# Patient Record
Sex: FEMALE | Race: BLACK OR AFRICAN AMERICAN | NOT HISPANIC OR LATINO | Employment: OTHER | URBAN - METROPOLITAN AREA
[De-identification: names, ages, dates, MRNs, and addresses within clinical notes are randomized per-mention and may not be internally consistent; named-entity substitution may affect disease eponyms.]

---

## 2023-06-14 LAB
ANION GAP IN SER/PLAS: 12 MMOL/L (ref 10–20)
APPEARANCE, URINE: CLEAR
BACTERIA, URINE: ABNORMAL /HPF
BILIRUBIN, URINE: NEGATIVE
BLOOD, URINE: NEGATIVE
CALCIDIOL (25 OH VITAMIN D3) (NG/ML) IN SER/PLAS: 36 NG/ML
CALCIUM (MG/DL) IN SER/PLAS: 10.5 MG/DL (ref 8.6–10.3)
CARBON DIOXIDE, TOTAL (MMOL/L) IN SER/PLAS: 26 MMOL/L (ref 21–32)
CHLORIDE (MMOL/L) IN SER/PLAS: 106 MMOL/L (ref 98–107)
COLOR, URINE: YELLOW
CREATININE (MG/DL) IN SER/PLAS: 1.59 MG/DL (ref 0.5–1.05)
CREATININE (MG/DL) IN URINE: 108 MG/DL (ref 20–320)
GFR FEMALE: 33 ML/MIN/1.73M2
GLUCOSE (MG/DL) IN SER/PLAS: 130 MG/DL (ref 74–99)
GLUCOSE, URINE: NEGATIVE MG/DL
HYALINE CASTS, URINE: ABNORMAL /LPF
KETONES, URINE: NEGATIVE MG/DL
LEUKOCYTE ESTERASE, URINE: NEGATIVE
NITRITE, URINE: NEGATIVE
PARATHYRIN INTACT (PG/ML) IN SER/PLAS: 80.1 PG/ML (ref 18.5–88)
PH, URINE: 5 (ref 5–8)
PHOSPHATE (MG/DL) IN SER/PLAS: 2.9 MG/DL (ref 2.5–4.9)
POTASSIUM (MMOL/L) IN SER/PLAS: 5 MMOL/L (ref 3.5–5.3)
PROTEIN (MG/DL) IN URINE: 228 MG/DL (ref 5–24)
PROTEIN, URINE: ABNORMAL MG/DL
PROTEIN/CREATININE (MG/MG) IN URINE: 2.11 MG/MG CREAT (ref 0–0.17)
RBC, URINE: ABNORMAL /HPF (ref 0–5)
SODIUM (MMOL/L) IN SER/PLAS: 139 MMOL/L (ref 136–145)
SPECIFIC GRAVITY, URINE: 1.01 (ref 1–1.03)
URATE (MG/DL) IN SER/PLAS: 7.4 MG/DL (ref 2.3–6.7)
UREA NITROGEN (MG/DL) IN SER/PLAS: 32 MG/DL (ref 6–23)
UROBILINOGEN, URINE: <2 MG/DL (ref 0–1.9)
WBC, URINE: ABNORMAL /HPF (ref 0–5)

## 2023-06-29 ENCOUNTER — APPOINTMENT (OUTPATIENT)
Dept: LAB | Facility: LAB | Age: 78
End: 2023-06-29
Payer: MEDICARE

## 2023-06-30 LAB
IMMUNOGLOBULIN LIGHT CHAINS KAPPA/LAMBDA (MASS RATIO) IN SERUM: 50.73 (ref 0.26–1.65)
IMMUNOGLOBULIN LIGHT CHAINS.KAPPA (MG/DL) IN SERUM: 115.15 MG/DL (ref 0.33–1.94)
IMMUNOGLOBULIN LIGHT CHAINS.LAMBDA (MG/DL) IN SERUM: 2.27 MG/DL (ref 0.57–2.63)

## 2023-07-05 LAB
ALBUMIN ELP: 4.3 G/DL (ref 3.4–5)
ALBUMIN/PROTEIN TOTAL (%) IN URINE BY ELECTROPHORESIS: 80.7 %
ALPHA 1 GLOBULIN/PROTEIN TOTAL (%) IN URINE BY ELECTROPHORESIS: 2.4 %
ALPHA 1: 0.4 G/DL (ref 0.2–0.6)
ALPHA 2 GLOBULIN/PROTEIN TOTAL (%) IN URINE BY ELECTROPHORESIS: 2.5 %
ALPHA 2: 1.1 G/DL (ref 0.4–1.1)
BETA GLOBULIN/PROTEIN TOTAL (%) IN URINE BY ELECTROPHORESIS: 5.8 %
BETA: 0.9 G/DL (ref 0.5–1.2)
GAMMA GLOBULIN/PROTEIN TOTAL (%) IN URINE BY ELECTROPHORESIS: 8.6 %
GAMMA GLOBULIN: 0.9 G/DL (ref 0.5–1.4)
IMMUNOFIXATION INTERPRETATION: ABNORMAL
M-PROTEIN 1 URINE %: 1.6 %
PATH REVIEW - SERUM IMMUNOFIXATION: NORMAL
PATH REVIEW - URINE IMMUNOFIXATION: NORMAL
PATH REVIEW-SERUM PROTEIN ELECTROPHORESIS: NORMAL
PATH REVIEW-URINE PROTEIN ELECTROPHORESIS: NORMAL
PROTEIN (MG/DL) IN URINE: 258 MG/DL (ref 5–24)
PROTEIN ELECTROPHORESIS INTERPRETATION: NORMAL
PROTEIN TOTAL: 7.5 G/DL (ref 6.4–8.2)
SERUM IMMUNOFIXATION INTERPRETATION: NORMAL
UPEP INTERPRETATION: ABNORMAL

## 2023-09-30 PROBLEM — Q23.1 BICUSPID AORTIC VALVE (HHS-HCC): Status: ACTIVE | Noted: 2023-09-30

## 2023-09-30 PROBLEM — I35.2 AORTIC INSUFFICIENCY WITH AORTIC STENOSIS: Status: ACTIVE | Noted: 2023-09-30

## 2023-09-30 PROBLEM — J44.9 CHRONIC OBSTRUCTIVE PULMONARY DISEASE, UNSPECIFIED (MULTI): Status: ACTIVE | Noted: 2022-08-11

## 2023-09-30 PROBLEM — K57.90 DVRTCLOS OF INTEST, PART UNSP, W/O PERF OR ABSCESS W/O BLEED: Status: ACTIVE | Noted: 2022-10-10

## 2023-09-30 PROBLEM — N18.9 CKD (CHRONIC KIDNEY DISEASE): Status: ACTIVE | Noted: 2023-09-30

## 2023-09-30 PROBLEM — R35.1 NOCTURIA: Status: ACTIVE | Noted: 2023-09-30

## 2023-09-30 PROBLEM — H35.3131 EARLY DRY STAGE NONEXUDATIVE AGE-RELATED MACULAR DEGENERATION OF BOTH EYES: Status: ACTIVE | Noted: 2019-07-15

## 2023-09-30 PROBLEM — E87.5 HYPERKALEMIA: Status: ACTIVE | Noted: 2023-09-30

## 2023-09-30 PROBLEM — M19.90 UNSPECIFIED OSTEOARTHRITIS, UNSPECIFIED SITE: Status: ACTIVE | Noted: 2022-10-10

## 2023-09-30 PROBLEM — I35.0 AORTIC STENOSIS, MODERATE: Status: ACTIVE | Noted: 2023-09-30

## 2023-09-30 PROBLEM — N18.30 STAGE 3 CHRONIC KIDNEY DISEASE (MULTI): Status: ACTIVE | Noted: 2022-10-05

## 2023-09-30 PROBLEM — A59.9 TRICHOMONIASIS: Status: ACTIVE | Noted: 2023-09-30

## 2023-09-30 PROBLEM — N89.8 VAGINAL DISCHARGE: Status: RESOLVED | Noted: 2023-09-30 | Resolved: 2023-09-30

## 2023-09-30 PROBLEM — N89.8 VAGINAL DISCHARGE: Status: ACTIVE | Noted: 2023-09-30

## 2023-09-30 PROBLEM — K60.3 PERIANAL FISTULA: Status: ACTIVE | Noted: 2023-09-30

## 2023-09-30 PROBLEM — F17.200 TOBACCO USE DISORDER: Status: ACTIVE | Noted: 2023-09-30

## 2023-09-30 PROBLEM — I25.10 ATHSCL HEART DISEASE OF NATIVE CORONARY ARTERY W/O ANG PCTRS: Status: ACTIVE | Noted: 2022-10-10

## 2023-09-30 PROBLEM — Z95.1 PRESENCE OF AORTOCORONARY BYPASS GRAFT: Status: ACTIVE | Noted: 2022-10-10

## 2023-09-30 PROBLEM — E08.9 DIABETES MELLITUS DUE TO UNDERLYING CONDITION WITHOUT COMPLICATION, WITHOUT LONG-TERM CURRENT USE OF INSULIN (MULTI): Status: ACTIVE | Noted: 2019-07-15

## 2023-09-30 PROBLEM — R91.1 SOLITARY PULMONARY NODULE: Status: ACTIVE | Noted: 2018-11-07

## 2023-09-30 PROBLEM — D62 ACUTE POSTHEMORRHAGIC ANEMIA: Status: ACTIVE | Noted: 2022-10-10

## 2023-09-30 PROBLEM — D47.2 GAMMOPATHY: Status: ACTIVE | Noted: 2023-09-30

## 2023-09-30 PROBLEM — R13.10 DYSPHAGIA, UNSPECIFIED: Status: ACTIVE | Noted: 2022-10-10

## 2023-09-30 PROBLEM — J92.9 PLEURAL THICKENING: Status: ACTIVE | Noted: 2023-09-30

## 2023-09-30 PROBLEM — D12.6 TUBULAR ADENOMA OF COLON: Status: ACTIVE | Noted: 2023-09-30

## 2023-09-30 PROBLEM — D62 ACUTE ON CHRONIC BLOOD LOSS ANEMIA: Status: ACTIVE | Noted: 2022-10-10

## 2023-09-30 PROBLEM — R87.810 ASCUS WITH POSITIVE HIGH RISK HPV CERVICAL: Status: ACTIVE | Noted: 2023-09-30

## 2023-09-30 PROBLEM — R93.89 ABNORMAL FINDING ON CT SCAN: Status: ACTIVE | Noted: 2023-09-30

## 2023-09-30 PROBLEM — E11.42 DIABETIC PERIPHERAL NEUROPATHY (MULTI): Status: ACTIVE | Noted: 2023-09-30

## 2023-09-30 PROBLEM — E11.22 TYPE 2 DIABETES MELLITUS WITH DIABETIC CHRONIC KIDNEY DISEASE (MULTI): Status: ACTIVE | Noted: 2022-10-10

## 2023-09-30 PROBLEM — Q23.81 BICUSPID AORTIC VALVE: Status: ACTIVE | Noted: 2023-09-30

## 2023-09-30 PROBLEM — E55.9 MILD VITAMIN D DEFICIENCY: Status: ACTIVE | Noted: 2023-09-30

## 2023-09-30 PROBLEM — Z95.2 S/P AVR (AORTIC VALVE REPLACEMENT): Status: ACTIVE | Noted: 2023-09-30

## 2023-09-30 PROBLEM — I25.10 ATHEROSCLEROSIS OF NATIVE CORONARY ARTERY OF NATIVE HEART WITHOUT ANGINA PECTORIS: Status: ACTIVE | Noted: 2022-08-11

## 2023-09-30 PROBLEM — H25.13 AGE-RELATED NUCLEAR CATARACT OF BOTH EYES: Status: ACTIVE | Noted: 2019-07-15

## 2023-09-30 PROBLEM — Z95.4 PRESENCE OF OTHER HEART-VALVE REPLACEMENT: Status: ACTIVE | Noted: 2022-10-10

## 2023-09-30 PROBLEM — E78.5 HYPERLIPIDEMIA: Status: ACTIVE | Noted: 2022-10-10

## 2023-09-30 PROBLEM — Z95.1 S/P CABG X 1: Status: ACTIVE | Noted: 2023-09-30

## 2023-09-30 PROBLEM — D12.6 ADENOMATOUS POLYP OF COLON: Status: ACTIVE | Noted: 2023-09-30

## 2023-09-30 PROBLEM — R80.9 PROTEINURIA: Status: ACTIVE | Noted: 2023-09-30

## 2023-09-30 PROBLEM — I35.1 AORTIC CUSP REGURGITATION: Status: ACTIVE | Noted: 2023-09-30

## 2023-09-30 PROBLEM — L84 PRE-ULCERATIVE CORN OR CALLOUS: Status: ACTIVE | Noted: 2023-09-30

## 2023-09-30 PROBLEM — B39.9 HISTOPLASMOSIS: Status: ACTIVE | Noted: 2022-10-10

## 2023-09-30 PROBLEM — R63.4 WEIGHT LOSS: Status: ACTIVE | Noted: 2023-09-30

## 2023-09-30 PROBLEM — Z87.891 PERSONAL HISTORY OF NICOTINE DEPENDENCE: Status: ACTIVE | Noted: 2022-10-10

## 2023-09-30 PROBLEM — H25.013 CORTICAL SENILE CATARACT OF BOTH EYES: Status: ACTIVE | Noted: 2023-09-30

## 2023-09-30 PROBLEM — I12.9 HYPERTENSIVE CHRONIC KIDNEY DISEASE W STG 1-4/UNSP CHR KDNY: Status: ACTIVE | Noted: 2022-10-10

## 2023-09-30 PROBLEM — G25.2 ACTION TREMOR: Status: ACTIVE | Noted: 2023-09-30

## 2023-09-30 PROBLEM — R39.15 URINARY URGENCY: Status: ACTIVE | Noted: 2023-09-30

## 2023-09-30 PROBLEM — G25.2 INTENTION TREMOR: Status: ACTIVE | Noted: 2023-03-30

## 2023-09-30 PROBLEM — Z04.9 CONDITION NOT FOUND: Status: ACTIVE | Noted: 2023-09-30

## 2023-09-30 PROBLEM — R09.89 CAROTID BRUIT: Status: ACTIVE | Noted: 2023-03-30

## 2023-09-30 PROBLEM — R87.610 ASCUS WITH POSITIVE HIGH RISK HPV CERVICAL: Status: ACTIVE | Noted: 2023-09-30

## 2023-09-30 PROBLEM — B39.9 HISTOPLASMOSIS, UNSPECIFIED: Status: ACTIVE | Noted: 2022-10-10

## 2023-09-30 PROBLEM — R01.1 HEART MURMUR: Status: ACTIVE | Noted: 2023-09-30

## 2023-09-30 PROBLEM — M54.50 LOW BACK PAIN: Status: ACTIVE | Noted: 2023-09-30

## 2023-09-30 PROBLEM — K60.30 PERIANAL FISTULA: Status: ACTIVE | Noted: 2023-09-30

## 2023-09-30 RX ORDER — TRAMADOL HYDROCHLORIDE 50 MG/1
50 TABLET ORAL EVERY 6 HOURS
COMMUNITY
Start: 2022-10-05

## 2023-09-30 RX ORDER — POLYETHYLENE GLYCOL 3350 17 G/17G
POWDER, FOR SOLUTION ORAL
COMMUNITY
Start: 2022-10-02

## 2023-09-30 RX ORDER — PREDNISOLONE ACETATE 10 MG/ML
SUSPENSION/ DROPS OPHTHALMIC 3 TIMES DAILY
COMMUNITY
Start: 2023-04-18

## 2023-09-30 RX ORDER — ASPIRIN 81 MG
1 TABLET, DELAYED RELEASE (ENTERIC COATED) ORAL DAILY
COMMUNITY
Start: 2022-10-10

## 2023-09-30 RX ORDER — AMLODIPINE BESYLATE 10 MG/1
10 TABLET ORAL DAILY
COMMUNITY

## 2023-09-30 RX ORDER — LISINOPRIL 40 MG/1
40 TABLET ORAL DAILY
COMMUNITY
Start: 2023-06-14

## 2023-09-30 RX ORDER — GLYBURIDE 2.5 MG/1
1 TABLET ORAL DAILY
COMMUNITY
Start: 2006-06-01

## 2023-09-30 RX ORDER — DOCUSATE SODIUM 100 MG/1
100 CAPSULE, LIQUID FILLED ORAL 2 TIMES DAILY PRN
COMMUNITY
Start: 2022-10-02

## 2023-09-30 RX ORDER — MULTIVITAMIN WITH MINERALS
1 TABLET ORAL DAILY
COMMUNITY

## 2023-09-30 RX ORDER — ATORVASTATIN CALCIUM 40 MG/1
TABLET, FILM COATED ORAL
COMMUNITY
Start: 2006-06-01

## 2023-09-30 RX ORDER — LISINOPRIL 20 MG/1
TABLET ORAL
COMMUNITY
Start: 2013-09-09

## 2023-09-30 RX ORDER — AMLODIPINE BESYLATE 5 MG/1
5 TABLET ORAL DAILY
COMMUNITY
Start: 2022-10-05

## 2023-09-30 RX ORDER — HYDROCHLOROTHIAZIDE 25 MG/1
25 TABLET ORAL
COMMUNITY

## 2023-09-30 RX ORDER — DIFLUPREDNATE OPHTHALMIC 0.5 MG/ML
1 EMULSION OPHTHALMIC 4 TIMES DAILY
COMMUNITY
Start: 2023-06-15

## 2023-09-30 RX ORDER — CHOLECALCIFEROL (VITAMIN D3) 25 MCG
25 TABLET ORAL DAILY
COMMUNITY

## 2023-09-30 RX ORDER — KETOROLAC TROMETHAMINE 5 MG/ML
1 SOLUTION OPHTHALMIC 4 TIMES DAILY
COMMUNITY
Start: 2023-06-15

## 2023-09-30 RX ORDER — METOPROLOL SUCCINATE 50 MG/1
1 TABLET, EXTENDED RELEASE ORAL DAILY
COMMUNITY
Start: 2006-07-25

## 2023-09-30 RX ORDER — LOPERAMIDE HCL 2 MG
2 TABLET ORAL AS NEEDED
COMMUNITY

## 2023-09-30 RX ORDER — ACETAMINOPHEN 325 MG/1
TABLET ORAL
COMMUNITY
Start: 2022-10-02

## 2023-09-30 RX ORDER — ASPIRIN 81 MG/1
TABLET ORAL DAILY
COMMUNITY
Start: 2013-09-09

## 2023-09-30 RX ORDER — FERROUS SULFATE 325(65) MG
TABLET ORAL DAILY
COMMUNITY
Start: 2022-10-10

## 2023-09-30 RX ORDER — METOPROLOL TARTRATE 50 MG/1
50 TABLET ORAL 2 TIMES DAILY
COMMUNITY
Start: 2022-10-05

## 2023-09-30 RX ORDER — CHLORTHALIDONE 25 MG/1
TABLET ORAL DAILY
COMMUNITY
Start: 2023-06-14

## 2023-10-02 ENCOUNTER — HOSPITAL ENCOUNTER (OUTPATIENT)
Dept: CARDIOLOGY | Facility: HOSPITAL | Age: 78
Discharge: HOME | End: 2023-10-02
Payer: MEDICARE

## 2023-10-02 DIAGNOSIS — Z95.2 S/P TAVR (TRANSCATHETER AORTIC VALVE REPLACEMENT): ICD-10-CM

## 2023-10-02 PROCEDURE — 93306 TTE W/DOPPLER COMPLETE: CPT | Performed by: INTERNAL MEDICINE

## 2023-10-02 PROCEDURE — 93306 TTE W/DOPPLER COMPLETE: CPT

## 2023-10-03 ENCOUNTER — TELEPHONE (OUTPATIENT)
Dept: ADMISSION | Facility: HOSPITAL | Age: 78
End: 2023-10-03
Payer: MEDICARE

## 2023-10-03 DIAGNOSIS — D50.8 OTHER IRON DEFICIENCY ANEMIA: Primary | ICD-10-CM

## 2023-10-03 NOTE — TELEPHONE ENCOUNTER
The patient has a PET scan tomorrow and wants to know if the team wants lab work tomorrow because she feels like her H&H might be low. Message routed to the team to see if the labs can be ordered for tomorrow.

## 2023-10-03 NOTE — TELEPHONE ENCOUNTER
CBC w/ D ordered per Pedro Brody. Pt aware that labs ordered, will go to lab and was appreciative.

## 2023-10-04 ENCOUNTER — HOSPITAL ENCOUNTER (OUTPATIENT)
Dept: RADIOLOGY | Facility: HOSPITAL | Age: 78
Discharge: HOME | End: 2023-10-04
Payer: MEDICARE

## 2023-10-04 ENCOUNTER — LAB (OUTPATIENT)
Dept: LAB | Facility: HOSPITAL | Age: 78
End: 2023-10-04
Payer: MEDICARE

## 2023-10-04 ENCOUNTER — APPOINTMENT (OUTPATIENT)
Dept: RADIOLOGY | Facility: HOSPITAL | Age: 78
End: 2023-10-04
Payer: MEDICARE

## 2023-10-04 DIAGNOSIS — D50.8 OTHER IRON DEFICIENCY ANEMIA: ICD-10-CM

## 2023-10-04 DIAGNOSIS — C90.00 MULTIPLE MYELOMA NOT HAVING ACHIEVED REMISSION (MULTI): ICD-10-CM

## 2023-10-04 DIAGNOSIS — R63.4 WEIGHT LOSS: Primary | ICD-10-CM

## 2023-10-04 LAB
BASOPHILS # BLD AUTO: 0.09 X10*3/UL (ref 0–0.1)
BASOPHILS NFR BLD AUTO: 1.1 %
EOSINOPHIL # BLD AUTO: 0.29 X10*3/UL (ref 0–0.4)
EOSINOPHIL NFR BLD AUTO: 3.7 %
ERYTHROCYTE [DISTWIDTH] IN BLOOD BY AUTOMATED COUNT: 13.4 % (ref 11.5–14.5)
GLUCOSE BLD MANUAL STRIP-MCNC: 157 MG/DL (ref 74–99)
HCT VFR BLD AUTO: 32.2 % (ref 36–46)
HGB BLD-MCNC: 10.6 G/DL (ref 12–16)
IMM GRANULOCYTES # BLD AUTO: 0.03 X10*3/UL (ref 0–0.5)
IMM GRANULOCYTES NFR BLD AUTO: 0.4 % (ref 0–0.9)
LYMPHOCYTES # BLD AUTO: 2.31 X10*3/UL (ref 0.8–3)
LYMPHOCYTES NFR BLD AUTO: 29.1 %
MCH RBC QN AUTO: 30.2 PG (ref 26–34)
MCHC RBC AUTO-ENTMCNC: 32.9 G/DL (ref 32–36)
MCV RBC AUTO: 92 FL (ref 80–100)
MONOCYTES # BLD AUTO: 0.58 X10*3/UL (ref 0.05–0.8)
MONOCYTES NFR BLD AUTO: 7.3 %
NEUTROPHILS # BLD AUTO: 4.64 X10*3/UL (ref 1.6–5.5)
NEUTROPHILS NFR BLD AUTO: 58.4 %
NRBC BLD-RTO: 0 /100 WBCS (ref 0–0)
PLATELET # BLD AUTO: 185 X10*3/UL (ref 150–450)
PMV BLD AUTO: 10.9 FL (ref 7.5–11.5)
RBC # BLD AUTO: 3.51 X10*6/UL (ref 4–5.2)
WBC # BLD AUTO: 7.9 X10*3/UL (ref 4.4–11.3)

## 2023-10-04 PROCEDURE — A9552 F18 FDG: HCPCS | Mod: SE

## 2023-10-04 PROCEDURE — 82947 ASSAY GLUCOSE BLOOD QUANT: CPT

## 2023-10-04 PROCEDURE — 78816 PET IMAGE W/CT FULL BODY: CPT | Mod: PET TUMOR SUBSQ TX STRATEGY | Performed by: NUCLEAR MEDICINE

## 2023-10-04 PROCEDURE — 85025 COMPLETE CBC W/AUTO DIFF WBC: CPT

## 2023-10-04 PROCEDURE — 3430000001 HC RX 343 DIAGNOSTIC RADIOPHARMACEUTICALS: Mod: SE

## 2023-10-04 PROCEDURE — 78816 PET IMAGE W/CT FULL BODY: CPT | Mod: PS

## 2023-10-04 PROCEDURE — 36415 COLL VENOUS BLD VENIPUNCTURE: CPT

## 2023-10-04 RX ORDER — FLUDEOXYGLUCOSE F 18 200 MCI/ML
9.3 INJECTION, SOLUTION INTRAVENOUS
Status: COMPLETED | OUTPATIENT
Start: 2023-10-04 | End: 2023-10-04

## 2023-10-04 RX ADMIN — FLUDEOXYGLUCOSE F 18 9.3 MILLICURIE: 200 INJECTION, SOLUTION INTRAVENOUS at 10:38

## 2023-10-05 ENCOUNTER — TELEPHONE (OUTPATIENT)
Dept: CARDIOLOGY | Facility: HOSPITAL | Age: 78
End: 2023-10-05
Payer: MEDICARE

## 2023-10-05 NOTE — TELEPHONE ENCOUNTER
Called and LVM for patient with results.     Janette Ring MD  [facility] [place] [id number] Clinical Support Staff    Kindly inform the patient that the echocardiogram is normal and her prosthetic valve is functioning well.  Just as planned, I will see her again in 6 months.

## 2023-10-05 NOTE — RESULT ENCOUNTER NOTE
Kindly inform the patient that the echocardiogram is normal and her prosthetic valve is functioning well.  Just as planned, I will see her again in 6 months.

## 2023-10-11 ENCOUNTER — TELEPHONE (OUTPATIENT)
Dept: ADMISSION | Facility: HOSPITAL | Age: 78
End: 2023-10-11
Payer: MEDICARE

## 2023-10-11 DIAGNOSIS — D50.8 OTHER IRON DEFICIENCY ANEMIA: ICD-10-CM

## 2023-10-11 NOTE — TELEPHONE ENCOUNTER
Per provider clinic note patient was supposed to have a phone visit to review results. Provider has a visit tomorrow 10/12/2023 at 0930, patient agreed to visit. Orders placed.

## 2023-10-12 ENCOUNTER — TELEPHONE (OUTPATIENT)
Dept: ADMISSION | Facility: HOSPITAL | Age: 78
End: 2023-10-12
Payer: MEDICARE

## 2023-10-12 ENCOUNTER — TELEMEDICINE (OUTPATIENT)
Dept: HEMATOLOGY/ONCOLOGY | Facility: HOSPITAL | Age: 78
End: 2023-10-12
Payer: MEDICARE

## 2023-10-12 DIAGNOSIS — S32.010S COMPRESSION FRACTURE OF L1 LUMBAR VERTEBRA, SEQUELA: ICD-10-CM

## 2023-10-12 DIAGNOSIS — D47.2 SMOLDERING MULTIPLE MYELOMA (SMM): Primary | ICD-10-CM

## 2023-10-12 DIAGNOSIS — D47.2 SMOLDERING MULTIPLE MYELOMA: Primary | ICD-10-CM

## 2023-10-12 PROCEDURE — 99443 PR PHYS/QHP TELEPHONE EVALUATION 21-30 MIN: CPT

## 2023-10-12 ASSESSMENT — ENCOUNTER SYMPTOMS
HEMATOLOGIC/LYMPHATIC NEGATIVE: 1
CARDIOVASCULAR NEGATIVE: 1
RESPIRATORY NEGATIVE: 1
NEUROLOGICAL NEGATIVE: 1
CONSTITUTIONAL NEGATIVE: 1
ENDOCRINE NEGATIVE: 1
EYES NEGATIVE: 1
GASTROINTESTINAL NEGATIVE: 1
PSYCHIATRIC NEGATIVE: 1
MUSCULOSKELETAL NEGATIVE: 1

## 2023-10-12 NOTE — PROGRESS NOTES
Patient ID: Felipa Hood is a 78 y.o. female.    Diagnosis: Kappa Light Chain Smoldering Myeloma    Has a past medical history of aortic stenosis, HTN, CKD 3, DM2, and HLD. Presents as a referral from her nephrologist Dr. Garcia for concern for plasma cell dyscrasia.      Workup:  SPEP (6/29/23): Discrete Kappa FLC not definitively monoclonal  SFLC (6/29/23): Kappa .15mg/dL, FLC ratio 50.73  Spot UPEP (6/29/23): Total protein 258, 1.6% kappa FLC   Osseous survey (8/2/23): negative for lytic lesions however showed osseous demineralization   24 hour urine (8/4/23): Total protein 538, UPEP pending   BMBx (9/7/23):  -- NORMOCELLULAR BONE MARROW (20-30%) WITH MATURING TRILINEAGE HEMATOPOIESIS  INVOLVEMENT BY KAPPA+ PLASMA CELL NEOPLASM (15-20% BY IMMUNOSTAINS), SEE NOTE.     NOTE: The findings are consistent with plasma cell neoplasm. In absence of myeloma defining events, the findings could be consistent with smoldering myeloma. Clinical, radiological and laboratory correlation is suggested.    FISH:   This analysis showed an abnormal result with gain of chromosome 1q and loss of IGH.  The assay did not find loss of 1p or rearrangement of IGH.  Please note, due to the quality of cells there was poor hybridization of probes for TP53 and CEP 3, 7, and 11. Therefore analysis for loss of TP53 and hyperdiploidy of chromosomes 3, 7, and 11 could not be performed.    PET/CT (10/4/23):   1. No evidence of a hypermetabolic osteolytic lesion throughout the  axial and appendicular skeleton to suggest a focal myelomatous  process.  2. Diffuse FDG uptake along the superior endplate of L1 vertebral  body compatible with recent compression fracture.         Medical History:  Aortic stenosis  HTN  CKD stage 3  DM 2  Cataracts  HLD  Pulmonary nodules  Histoplasmosis  Lower back pain     Surgical History:  Aortic valve replacement  CABG  Lung wedge resection      Social History:  Retired  Former smoker     Family History:    Mother- malignant neoplasm  Father- HTN, lung disease, malignant neoplasm        -------------------------------------------------------------------------------------------------------  Subjective       HPI  Felipa presents to clinic 10/12/23 for a follow up phone visit to review her PET/CT.    She is feeling well overall. Doesn't even notice her back pain most of the time related to the compression fracture. Applying pain patches.    Planning to do some traveling in the near future.     Review of Systems   Constitutional: Negative.    HENT:  Negative.     Eyes: Negative.    Respiratory: Negative.     Cardiovascular: Negative.    Gastrointestinal: Negative.    Endocrine: Negative.    Genitourinary: Negative.     Musculoskeletal: Negative.    Skin: Negative.    Neurological: Negative.    Hematological: Negative.    Psychiatric/Behavioral: Negative.        -------------------------------------------------------------------------------------------------------  Objective   BSA: There is no height or weight on file to calculate BSA.  There were no vitals taken for this visit.    Performance Status:  Asymptomatic  -------------------------------------------------------------------------------------------------------  Assessment/Plan      Plasma Cell Dyscrasia:  - Discrete Kappa FLC not definitively monoclonal noted on SPEP  - Spot UPEP showed 1.6% Kappa FLC M protein  - Kappa .15mg/dL, FLC ratio 50.73  - 24 hour UPEP pending  - Osseous survey negative for lytic lesions   - BMBx showed 15-20% plasma cells consistent with smoldering myeloma  - FISH showed gain of 1q and rearrangement of IGH  - PET/CT negative outside of L1 compression fracture  - Labs in 1 month     Renal:  - Follows with Dr. Garcia  - CKD stage 4  - Recent sCr >3, hence workup for plasma cell dyscrasia  - sCr 1.83mg/dL (9/9/23)     Back Fracture:  - Obtained back fracture when moving TV  - No surgical intervention necessary  - Pain improving   -  Using PRN Lidocaine patches     Cardiology:  - Follows with Dr. 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

## 2023-10-12 NOTE — TELEPHONE ENCOUNTER
Felipa called the office upset because she was supposed to have a phone appointment this morning to review her PET results but scheduling never completed the order. I secure chatted Pedro Brody who confirmed his schedule is full, however, he will call Felipa today with her results.  She voiced frustration with the  system. I offered her the number for patient advocacy which she did not want. No further questions or concerns at this time.

## 2023-10-27 ENCOUNTER — TELEPHONE (OUTPATIENT)
Dept: HEMATOLOGY/ONCOLOGY | Facility: HOSPITAL | Age: 78
End: 2023-10-27
Payer: MEDICARE

## 2023-10-27 NOTE — TELEPHONE ENCOUNTER
Called and left Felipa a message. Per Pedro Brody CNP, another PET scan is not needed. Plan is for Felipa to get labs the last week of November and follow up with a phone visit the first week of December.

## 2023-11-08 ENCOUNTER — APPOINTMENT (OUTPATIENT)
Dept: RADIOLOGY | Facility: HOSPITAL | Age: 78
End: 2023-11-08
Payer: MEDICARE

## 2023-11-08 ENCOUNTER — APPOINTMENT (OUTPATIENT)
Dept: LAB | Facility: HOSPITAL | Age: 78
End: 2023-11-08
Payer: MEDICARE

## 2023-11-30 ENCOUNTER — LAB (OUTPATIENT)
Dept: LAB | Facility: HOSPITAL | Age: 78
End: 2023-11-30
Payer: MEDICARE

## 2023-11-30 DIAGNOSIS — D47.2 SMOLDERING MULTIPLE MYELOMA: ICD-10-CM

## 2023-11-30 LAB
ALBUMIN SERPL BCP-MCNC: 4.4 G/DL (ref 3.4–5)
ALP SERPL-CCNC: 77 U/L (ref 33–136)
ALT SERPL W P-5'-P-CCNC: 9 U/L (ref 7–45)
ANION GAP SERPL CALC-SCNC: 15 MMOL/L (ref 10–20)
AST SERPL W P-5'-P-CCNC: 16 U/L (ref 9–39)
BASOPHILS # BLD AUTO: 0.09 X10*3/UL (ref 0–0.1)
BASOPHILS NFR BLD AUTO: 1 %
BILIRUB SERPL-MCNC: 0.4 MG/DL (ref 0–1.2)
BUN SERPL-MCNC: 30 MG/DL (ref 6–23)
CALCIUM SERPL-MCNC: 10.4 MG/DL (ref 8.6–10.3)
CHLORIDE SERPL-SCNC: 109 MMOL/L (ref 98–107)
CO2 SERPL-SCNC: 20 MMOL/L (ref 21–32)
CREAT SERPL-MCNC: 1.53 MG/DL (ref 0.5–1.05)
EOSINOPHIL # BLD AUTO: 0.48 X10*3/UL (ref 0–0.4)
EOSINOPHIL NFR BLD AUTO: 5.2 %
ERYTHROCYTE [DISTWIDTH] IN BLOOD BY AUTOMATED COUNT: 13.9 % (ref 11.5–14.5)
GFR SERPL CREATININE-BSD FRML MDRD: 34 ML/MIN/1.73M*2
GLUCOSE SERPL-MCNC: 123 MG/DL (ref 74–99)
HCT VFR BLD AUTO: 31.1 % (ref 36–46)
HGB BLD-MCNC: 10.1 G/DL (ref 12–16)
IGA SERPL-MCNC: 245 MG/DL (ref 70–400)
IGG SERPL-MCNC: 1020 MG/DL (ref 700–1600)
IGM SERPL-MCNC: 23 MG/DL (ref 40–230)
IMM GRANULOCYTES # BLD AUTO: 0.05 X10*3/UL (ref 0–0.5)
IMM GRANULOCYTES NFR BLD AUTO: 0.5 % (ref 0–0.9)
LYMPHOCYTES # BLD AUTO: 2.48 X10*3/UL (ref 0.8–3)
LYMPHOCYTES NFR BLD AUTO: 26.7 %
MCH RBC QN AUTO: 29.7 PG (ref 26–34)
MCHC RBC AUTO-ENTMCNC: 32.5 G/DL (ref 32–36)
MCV RBC AUTO: 92 FL (ref 80–100)
MONOCYTES # BLD AUTO: 0.71 X10*3/UL (ref 0.05–0.8)
MONOCYTES NFR BLD AUTO: 7.6 %
NEUTROPHILS # BLD AUTO: 5.49 X10*3/UL (ref 1.6–5.5)
NEUTROPHILS NFR BLD AUTO: 59 %
NRBC BLD-RTO: 0 /100 WBCS (ref 0–0)
PLATELET # BLD AUTO: 243 X10*3/UL (ref 150–450)
POTASSIUM SERPL-SCNC: 4.7 MMOL/L (ref 3.5–5.3)
PROT SERPL-MCNC: 7.1 G/DL (ref 6.4–8.2)
PROT SERPL-MCNC: 7.4 G/DL (ref 6.4–8.2)
RBC # BLD AUTO: 3.4 X10*6/UL (ref 4–5.2)
SODIUM SERPL-SCNC: 139 MMOL/L (ref 136–145)
WBC # BLD AUTO: 9.3 X10*3/UL (ref 4.4–11.3)

## 2023-11-30 PROCEDURE — 84165 PROTEIN E-PHORESIS SERUM: CPT

## 2023-11-30 PROCEDURE — 36415 COLL VENOUS BLD VENIPUNCTURE: CPT

## 2023-11-30 PROCEDURE — 84165 PROTEIN E-PHORESIS SERUM: CPT | Performed by: STUDENT IN AN ORGANIZED HEALTH CARE EDUCATION/TRAINING PROGRAM

## 2023-11-30 PROCEDURE — 84155 ASSAY OF PROTEIN SERUM: CPT

## 2023-11-30 PROCEDURE — 82784 ASSAY IGA/IGD/IGG/IGM EACH: CPT

## 2023-11-30 PROCEDURE — 83521 IG LIGHT CHAINS FREE EACH: CPT

## 2023-11-30 PROCEDURE — 80053 COMPREHEN METABOLIC PANEL: CPT

## 2023-11-30 PROCEDURE — 86320 SERUM IMMUNOELECTROPHORESIS: CPT | Performed by: STUDENT IN AN ORGANIZED HEALTH CARE EDUCATION/TRAINING PROGRAM

## 2023-11-30 PROCEDURE — 85025 COMPLETE CBC W/AUTO DIFF WBC: CPT

## 2023-12-01 LAB
KAPPA LC SERPL-MCNC: 122.77 MG/DL (ref 0.33–1.94)
KAPPA LC/LAMBDA SER: 59.02 {RATIO} (ref 0.26–1.65)
LAMBDA LC SERPL-MCNC: 2.08 MG/DL (ref 0.57–2.63)

## 2023-12-05 LAB
ALBUMIN: 4 G/DL (ref 3.4–5)
ALPHA 1 GLOBULIN: 0.4 G/DL (ref 0.2–0.6)
ALPHA 2 GLOBULIN: 1 G/DL (ref 0.4–1.1)
BETA GLOBULIN: 0.8 G/DL (ref 0.5–1.2)
GAMMA GLOBULIN: 0.8 G/DL (ref 0.5–1.4)
IMMUNOFIXATION COMMENT: ABNORMAL
M-PROTEIN 1: 0.1 G/DL
PATH REVIEW - SERUM IMMUNOFIXATION: ABNORMAL
PATH REVIEW-SERUM PROTEIN ELECTROPHORESIS: ABNORMAL
PROTEIN ELECTROPHORESIS COMMENT: ABNORMAL

## 2023-12-07 ENCOUNTER — OFFICE VISIT (OUTPATIENT)
Dept: HEMATOLOGY/ONCOLOGY | Facility: HOSPITAL | Age: 78
End: 2023-12-07
Payer: MEDICARE

## 2023-12-07 DIAGNOSIS — D47.2 SMOLDERING MULTIPLE MYELOMA: Primary | ICD-10-CM

## 2023-12-07 PROCEDURE — 99443 PR PHYS/QHP TELEPHONE EVALUATION 21-30 MIN: CPT

## 2023-12-07 PROCEDURE — 1126F AMNT PAIN NOTED NONE PRSNT: CPT

## 2023-12-08 ASSESSMENT — ENCOUNTER SYMPTOMS
RESPIRATORY NEGATIVE: 1
PSYCHIATRIC NEGATIVE: 1
EYES NEGATIVE: 1
ENDOCRINE NEGATIVE: 1
CARDIOVASCULAR NEGATIVE: 1
HEMATOLOGIC/LYMPHATIC NEGATIVE: 1
ALLERGIC/IMMUNOLOGIC NEGATIVE: 1
GASTROINTESTINAL NEGATIVE: 1
NEUROLOGICAL NEGATIVE: 1
BACK PAIN: 1
CONSTITUTIONAL NEGATIVE: 1

## 2023-12-08 NOTE — PROGRESS NOTES
Patient ID: Felipa Hood is a 78 y.o. female.  Referring Physician: No referring provider defined for this encounter.  Primary Care Provider: DB Lobo  Date of Service:  12/7/2023    Diagnosis: Kappa Light Chain Smoldering Myeloma    Has a past medical history of aortic stenosis, HTN, CKD 3, DM2, and HLD. Presents as a referral from her nephrologist Dr. Garcia for concern for plasma cell dyscrasia.      Workup:  SPEP (6/29/23): Discrete Kappa FLC not definitively monoclonal  SFLC (6/29/23): Kappa .15mg/dL, FLC ratio 50.73  Spot UPEP (6/29/23): Total protein 258, 1.6% kappa FLC   Osseous survey (8/2/23): negative for lytic lesions however showed osseous demineralization   24 hour urine (8/4/23): Total protein 538, UPEP pending   BMBx (9/7/23):  -- NORMOCELLULAR BONE MARROW (20-30%) WITH MATURING TRILINEAGE HEMATOPOIESIS  INVOLVEMENT BY KAPPA+ PLASMA CELL NEOPLASM (15-20% BY IMMUNOSTAINS), SEE NOTE.     NOTE: The findings are consistent with plasma cell neoplasm. In absence of myeloma defining events, the findings could be consistent with smoldering myeloma. Clinical, radiological and laboratory correlation is suggested.    FISH:   This analysis showed an abnormal result with gain of chromosome 1q and loss of IGH.  The assay did not find loss of 1p or rearrangement of IGH.  Please note, due to the quality of cells there was poor hybridization of probes for TP53 and CEP 3, 7, and 11. Therefore analysis for loss of TP53 and hyperdiploidy of chromosomes 3, 7, and 11 could not be performed.    PET/CT (10/4/23):   1. No evidence of a hypermetabolic osteolytic lesion throughout the  axial and appendicular skeleton to suggest a focal myelomatous  process.  2. Diffuse FDG uptake along the superior endplate of L1 vertebral  body compatible with recent compression fracture.         Medical History:  Aortic stenosis  HTN  CKD stage 3  DM 2  Cataracts  HLD  Pulmonary nodules  Histoplasmosis  Lower  back pain     Surgical History:  Aortic valve replacement  CABG  Lung wedge resection      Social History:  Retired  Former smoker     Family History:   Mother- malignant neoplasm  Father- HTN, lung disease, malignant neoplasm        ASSESSMENT and PLAN:    Plasma Cell Dyscrasia:  - Discrete Kappa FLC not definitively monoclonal noted on SPEP  - Spot UPEP showed 1.6% Kappa FLC M protein  - Kappa .15mg/dL, FLC ratio 50.73  - 24 hour UPEP pending  - Osseous survey negative for lytic lesions   - BMBx showed 15-20% plasma cells consistent with smoldering myeloma  - FISH showed gain of 1q and rearrangement of IGH  - PET/CT negative outside of L1 compression fracture     Renal:  - Follows with Dr. Garcia  - CKD stage 4  - Recent sCr >3, hence workup for plasma cell dyscrasia  - sCr 1.53mg/dL     Back Fracture:  - Obtained back fracture when moving TV  - No surgical intervention necessary  - Pain improving   - Using PRN Lidocaine patches  - Pain has resolved     Cardiology:  - Follows with Dr. Ring  - History of HTN, Aortic stenosis  - S/p AVR, CABG  - Recent episode of lightheadedness  - Wore Holter monitor     SUBJECTIVE:  History of Present Illness:  Felipa presents to clinic 12/7/23 for a follow up phone call.    Overall she is doing well and is without acute complaints.     Her back is healed for the most part. Has occasional pain but nothing substantial.             Review of Systems   Constitutional: Negative.    HENT: Negative.     Eyes: Negative.    Respiratory: Negative.     Cardiovascular: Negative.    Gastrointestinal: Negative.    Endocrine: Negative.    Genitourinary: Negative.    Musculoskeletal:  Positive for back pain.   Allergic/Immunologic: Negative.    Neurological: Negative.    Hematological: Negative.    Psychiatric/Behavioral: Negative.       OBJECTIVE:  KPS: Karnofsky Score: 90 - Able to carry on normal activity; minor signs or symptoms of disease    VS:  There were no vitals taken for this  visit.  BSA: There is no height or weight on file to calculate BSA.    Laboratory:  The pertinent laboratory results were reviewed and discussed with the patient.    Lab Results   Component Value Date    WBC 9.3 11/30/2023    HCT 31.1 (L) 11/30/2023    HGB 10.1 (L) 11/30/2023     11/30/2023    K 4.7 11/30/2023    CALCIUM 10.4 (H) 11/30/2023     11/30/2023    MG 1.80 11/11/2022    ALT 9 11/30/2023    AST 16 11/30/2023    BUN 30 (H) 11/30/2023    CREATININE 1.53 (H) 11/30/2023    PHOS 2.9 06/14/2023    KAPPA 122.77 (H) 11/30/2023    LAMBDA 2.08 11/30/2023    KAPLS 59.02 (H) 11/30/2023    SPEP Aberrant band detected. See immunofixation. 11/30/2023    IEPIN  11/30/2023 11/30/23  Known monoclonal IgG kappa in the gamma region at 0.1 g/dL. Unchanged from the previous analysis on 8/2/23.    IGG 1,020 11/30/2023    IGM 23 (L) 11/30/2023     11/30/2023      Note: for a comprehensive list of the patient's lab results, access the Results Review activity.    RTC:  2 month phone visit     Arjun Brody, JARED-CNP

## 2023-12-13 LAB — HOLD SPECIMEN: NORMAL

## 2024-02-01 ENCOUNTER — LAB (OUTPATIENT)
Dept: LAB | Facility: HOSPITAL | Age: 79
End: 2024-02-01
Payer: MEDICARE

## 2024-02-01 DIAGNOSIS — D47.2 SMOLDERING MULTIPLE MYELOMA: ICD-10-CM

## 2024-02-01 LAB
ALBUMIN SERPL BCP-MCNC: 4.3 G/DL (ref 3.4–5)
ALP SERPL-CCNC: 79 U/L (ref 33–136)
ALT SERPL W P-5'-P-CCNC: 9 U/L (ref 7–45)
ANION GAP SERPL CALC-SCNC: 15 MMOL/L (ref 10–20)
AST SERPL W P-5'-P-CCNC: 17 U/L (ref 9–39)
BASOPHILS # BLD AUTO: 0.07 X10*3/UL (ref 0–0.1)
BASOPHILS NFR BLD AUTO: 0.9 %
BILIRUB SERPL-MCNC: 0.5 MG/DL (ref 0–1.2)
BUN SERPL-MCNC: 30 MG/DL (ref 6–23)
CALCIUM SERPL-MCNC: 9.9 MG/DL (ref 8.6–10.3)
CHLORIDE SERPL-SCNC: 107 MMOL/L (ref 98–107)
CO2 SERPL-SCNC: 21 MMOL/L (ref 21–32)
CREAT SERPL-MCNC: 1.56 MG/DL (ref 0.5–1.05)
EGFRCR SERPLBLD CKD-EPI 2021: 34 ML/MIN/1.73M*2
EOSINOPHIL # BLD AUTO: 0.34 X10*3/UL (ref 0–0.4)
EOSINOPHIL NFR BLD AUTO: 4.4 %
ERYTHROCYTE [DISTWIDTH] IN BLOOD BY AUTOMATED COUNT: 13.4 % (ref 11.5–14.5)
GLUCOSE SERPL-MCNC: 110 MG/DL (ref 74–99)
HCT VFR BLD AUTO: 31.7 % (ref 36–46)
HGB BLD-MCNC: 10.4 G/DL (ref 12–16)
IGA SERPL-MCNC: 228 MG/DL (ref 70–400)
IGG SERPL-MCNC: 997 MG/DL (ref 700–1600)
IGM SERPL-MCNC: 23 MG/DL (ref 40–230)
IMM GRANULOCYTES # BLD AUTO: 0.02 X10*3/UL (ref 0–0.5)
IMM GRANULOCYTES NFR BLD AUTO: 0.3 % (ref 0–0.9)
LYMPHOCYTES # BLD AUTO: 2.2 X10*3/UL (ref 0.8–3)
LYMPHOCYTES NFR BLD AUTO: 28.2 %
MCH RBC QN AUTO: 30.4 PG (ref 26–34)
MCHC RBC AUTO-ENTMCNC: 32.8 G/DL (ref 32–36)
MCV RBC AUTO: 93 FL (ref 80–100)
MONOCYTES # BLD AUTO: 0.56 X10*3/UL (ref 0.05–0.8)
MONOCYTES NFR BLD AUTO: 7.2 %
NEUTROPHILS # BLD AUTO: 4.61 X10*3/UL (ref 1.6–5.5)
NEUTROPHILS NFR BLD AUTO: 59 %
NRBC BLD-RTO: 0 /100 WBCS (ref 0–0)
PLATELET # BLD AUTO: 202 X10*3/UL (ref 150–450)
POTASSIUM SERPL-SCNC: 4.3 MMOL/L (ref 3.5–5.3)
PROT SERPL-MCNC: 7 G/DL (ref 6.4–8.2)
PROT SERPL-MCNC: 7.3 G/DL (ref 6.4–8.2)
RBC # BLD AUTO: 3.42 X10*6/UL (ref 4–5.2)
SODIUM SERPL-SCNC: 139 MMOL/L (ref 136–145)
WBC # BLD AUTO: 7.8 X10*3/UL (ref 4.4–11.3)

## 2024-02-01 PROCEDURE — 84165 PROTEIN E-PHORESIS SERUM: CPT | Performed by: PATHOLOGY

## 2024-02-01 PROCEDURE — 84155 ASSAY OF PROTEIN SERUM: CPT | Mod: 59

## 2024-02-01 PROCEDURE — 86334 IMMUNOFIX E-PHORESIS SERUM: CPT

## 2024-02-01 PROCEDURE — 82784 ASSAY IGA/IGD/IGG/IGM EACH: CPT

## 2024-02-01 PROCEDURE — 83521 IG LIGHT CHAINS FREE EACH: CPT

## 2024-02-01 PROCEDURE — 80053 COMPREHEN METABOLIC PANEL: CPT

## 2024-02-01 PROCEDURE — 36415 COLL VENOUS BLD VENIPUNCTURE: CPT

## 2024-02-01 PROCEDURE — 85025 COMPLETE CBC W/AUTO DIFF WBC: CPT

## 2024-02-01 PROCEDURE — 86320 SERUM IMMUNOELECTROPHORESIS: CPT | Performed by: PATHOLOGY

## 2024-02-02 LAB
KAPPA LC SERPL-MCNC: 124.09 MG/DL (ref 0.33–1.94)
KAPPA LC/LAMBDA SER: 59.95 {RATIO} (ref 0.26–1.65)
LAMBDA LC SERPL-MCNC: 2.07 MG/DL (ref 0.57–2.63)

## 2024-02-05 LAB
ALBUMIN: 4.1 G/DL (ref 3.4–5)
ALPHA 1 GLOBULIN: 0.3 G/DL (ref 0.2–0.6)
ALPHA 2 GLOBULIN: 0.9 G/DL (ref 0.4–1.1)
BETA GLOBULIN: 0.8 G/DL (ref 0.5–1.2)
GAMMA GLOBULIN: 0.9 G/DL (ref 0.5–1.4)
IMMUNOFIXATION COMMENT: ABNORMAL
M-PROTEIN 1: 0.1 G/DL
PATH REVIEW - SERUM IMMUNOFIXATION: ABNORMAL
PATH REVIEW-SERUM PROTEIN ELECTROPHORESIS: ABNORMAL
PROTEIN ELECTROPHORESIS COMMENT: ABNORMAL

## 2024-02-13 ENCOUNTER — TELEMEDICINE (OUTPATIENT)
Dept: HEMATOLOGY/ONCOLOGY | Facility: HOSPITAL | Age: 79
End: 2024-02-13
Payer: MEDICARE

## 2024-02-13 DIAGNOSIS — D64.9 ANEMIA, UNSPECIFIED TYPE: Primary | ICD-10-CM

## 2024-02-13 DIAGNOSIS — D47.2 SMOLDERING MULTIPLE MYELOMA: ICD-10-CM

## 2024-02-13 PROCEDURE — 99215 OFFICE O/P EST HI 40 MIN: CPT

## 2024-02-13 PROCEDURE — 1126F AMNT PAIN NOTED NONE PRSNT: CPT

## 2024-02-13 ASSESSMENT — ENCOUNTER SYMPTOMS
HEMATOLOGIC/LYMPHATIC NEGATIVE: 1
FATIGUE: 1
CARDIOVASCULAR NEGATIVE: 1
NEUROLOGICAL NEGATIVE: 1
EYES NEGATIVE: 1
ALLERGIC/IMMUNOLOGIC NEGATIVE: 1
RESPIRATORY NEGATIVE: 1
PSYCHIATRIC NEGATIVE: 1
ENDOCRINE NEGATIVE: 1
GASTROINTESTINAL NEGATIVE: 1
BACK PAIN: 1

## 2024-02-13 NOTE — PROGRESS NOTES
Patient ID: Felipa Hood is a 78 y.o. female.  Referring Physician: Arjun Brody, APRN-CNP  [id number] [place] [address]  Primary Care Provider: DB Lobo  Date of Service:  2/13/2024    Diagnosis: Kappa Light Chain Smoldering Myeloma     Has a past medical history of aortic stenosis, HTN, CKD 3, DM2, and HLD. Presents as a referral from her nephrologist Dr. Garcia for concern for plasma cell dyscrasia.      Workup:  SPEP (6/29/23): Discrete Kappa FLC not definitively monoclonal  SFLC (6/29/23): Kappa .15mg/dL, FLC ratio 50.73  Spot UPEP (6/29/23): Total protein 258, 1.6% kappa FLC   Osseous survey (8/2/23): negative for lytic lesions however showed osseous demineralization   24 hour urine (8/4/23): Total protein 538, UPEP pending   BMBx (9/7/23):  -- NORMOCELLULAR BONE MARROW (20-30%) WITH MATURING TRILINEAGE HEMATOPOIESIS  INVOLVEMENT BY KAPPA+ PLASMA CELL NEOPLASM (15-20% BY IMMUNOSTAINS), SEE NOTE.     NOTE: The findings are consistent with plasma cell neoplasm. In absence of myeloma defining events, the findings could be consistent with smoldering myeloma. Clinical, radiological and laboratory correlation is suggested.     FISH:   This analysis showed an abnormal result with gain of chromosome 1q and loss of IGH.  The assay did not find loss of 1p or rearrangement of IGH.  Please note, due to the quality of cells there was poor hybridization of probes for TP53 and CEP 3, 7, and 11. Therefore analysis for loss of TP53 and hyperdiploidy of chromosomes 3, 7, and 11 could not be performed.     PET/CT (10/4/23):   1. No evidence of a hypermetabolic osteolytic lesion throughout the  axial and appendicular skeleton to suggest a focal myelomatous  process.  2. Diffuse FDG uptake along the superior endplate of L1 vertebral  body compatible with recent compression fracture.         Medical History:  Aortic stenosis  HTN  CKD stage 3  DM 2  Cataracts  HLD  Pulmonary  nodules  Histoplasmosis  Lower back pain     Surgical History:  Aortic valve replacement  CABG  Lung wedge resection      Social History:  Retired  Former smoker     Family History:   Mother- malignant neoplasm  Father- HTN, lung disease, malignant neoplasm      SUBJECTIVE:  History of Present Illness:  Felipa presents to clinic 2/13/24 for a virtual visit.    Overall she is feeling well. Currently visiting her daughter in [place].    She is reporting some fatigue which she attributes to anemia.         Review of Systems   Constitutional:  Positive for fatigue.   HENT: Negative.     Eyes: Negative.    Respiratory: Negative.     Cardiovascular: Negative.    Gastrointestinal: Negative.    Endocrine: Negative.    Genitourinary: Negative.    Musculoskeletal:  Positive for back pain.   Skin: Negative.    Allergic/Immunologic: Negative.    Neurological: Negative.    Hematological: Negative.    Psychiatric/Behavioral: Negative.       OBJECTIVE:  KPS: Karnofsky Score: 90 - Able to carry on normal activity; minor signs or symptoms of disease    VS:  There were no vitals taken for this visit.  BSA: There is no height or weight on file to calculate BSA.    Physical Exam  Constitutional:       Appearance: Normal appearance. She is normal weight.   HENT:      Nose: Nose normal.   Eyes:      Conjunctiva/sclera: Conjunctivae normal.   Pulmonary:      Effort: Pulmonary effort is normal.   Musculoskeletal:         General: Normal range of motion.      Cervical back: Normal range of motion and neck supple.   Neurological:      General: No focal deficit present.      Mental Status: She is alert and oriented to person, place, and time. Mental status is at baseline.   Psychiatric:         Mood and Affect: Mood normal.         Behavior: Behavior normal.         Thought Content: Thought content normal.         Judgment: Judgment normal.       Laboratory:  The pertinent laboratory results were reviewed and discussed with the  patient.    Lab Results   Component Value Date    WBC 7.8 02/01/2024    HCT 31.7 (L) 02/01/2024    HGB 10.4 (L) 02/01/2024     02/01/2024    K 4.3 02/01/2024    CALCIUM 9.9 02/01/2024     02/01/2024    MG 1.80 11/11/2022    ALT 9 02/01/2024    AST 17 02/01/2024    BUN 30 (H) 02/01/2024    CREATININE 1.56 (H) 02/01/2024    PHOS 2.9 06/14/2023    KAPPA 124.09 (H) 02/01/2024    LAMBDA 2.07 02/01/2024    KAPLS 59.95 (H) 02/01/2024    SPEP Aberrant band detected. See immunofixation. 02/01/2024    IEPIN  02/01/2024     Faint known monoclonal IgG kappa in the gamma region at 0.1 g/dL. Unchanged from the previous analysis on 11/30/23.     02/01/2024    IGM 23 (L) 02/01/2024     02/01/2024      Note: for a comprehensive list of the patient's lab results, access the Results Review activity.    ASSESSMENT and PLAN:     Plasma Cell Dyscrasia:  - Discrete Kappa FLC not definitively monoclonal noted on SPEP  - Spot UPEP showed 1.6% Kappa FLC M protein  - Kappa .15mg/dL, FLC ratio 50.73  - 24 hour UPEP pending  - Osseous survey negative for lytic lesions   - BMBx showed 15-20% plasma cells consistent with smoldering myeloma  - FISH showed gain of 1q and rearrangement of IGH  - PET/CT negative outside of L1 compression fracture     Renal:  - Follows with Dr. Garcia  - CKD stage 4  - Recent sCr >3, hence workup for plasma cell dyscrasia  - sCr 1.56mg/dL     Cardiology:  - Follows with Dr. Ring  - History of HTN, Aortic stenosis  - S/p AVR, CABG  - Recent episode of lightheadedness  - Wore Holter monitor     Anemia:  - Hgb 10.4  - Ordered anemia workup for next set of labs  - Taking iron supplement     RTC:  2 months w/ labs prior     JARED Hoyt-CNP

## 2024-02-28 ENCOUNTER — HOSPITAL ENCOUNTER (OUTPATIENT)
Dept: RADIOLOGY | Facility: HOSPITAL | Age: 79
Discharge: HOME | End: 2024-02-28
Payer: MEDICARE

## 2024-02-28 DIAGNOSIS — Z78.0 ASYMPTOMATIC MENOPAUSAL STATE: ICD-10-CM

## 2024-02-28 PROCEDURE — 77080 DXA BONE DENSITY AXIAL: CPT | Performed by: RADIOLOGY

## 2024-02-28 PROCEDURE — 77080 DXA BONE DENSITY AXIAL: CPT

## 2024-04-30 ENCOUNTER — TELEPHONE (OUTPATIENT)
Dept: SCHEDULING | Age: 79
End: 2024-04-30
Payer: MEDICARE

## 2024-05-06 ENCOUNTER — TELEPHONE (OUTPATIENT)
Dept: HEMATOLOGY/ONCOLOGY | Facility: HOSPITAL | Age: 79
End: 2024-05-06

## 2024-05-06 DIAGNOSIS — Z95.2 STATUS POST AORTIC VALVE REPLACEMENT: Primary | ICD-10-CM

## 2024-05-08 ENCOUNTER — LAB (OUTPATIENT)
Dept: LAB | Facility: HOSPITAL | Age: 79
End: 2024-05-08
Payer: MEDICARE

## 2024-05-08 DIAGNOSIS — D47.2 SMOLDERING MULTIPLE MYELOMA: ICD-10-CM

## 2024-05-08 LAB
ALBUMIN SERPL BCP-MCNC: 4.3 G/DL (ref 3.4–5)
ALP SERPL-CCNC: 70 U/L (ref 33–136)
ALT SERPL W P-5'-P-CCNC: 9 U/L (ref 7–45)
ANION GAP SERPL CALC-SCNC: 14 MMOL/L (ref 10–20)
AST SERPL W P-5'-P-CCNC: 17 U/L (ref 9–39)
BASOPHILS # BLD AUTO: 0.08 X10*3/UL (ref 0–0.1)
BASOPHILS NFR BLD AUTO: 1.2 %
BILIRUB SERPL-MCNC: 0.5 MG/DL (ref 0–1.2)
BUN SERPL-MCNC: 40 MG/DL (ref 6–23)
CALCIUM SERPL-MCNC: 10.1 MG/DL (ref 8.6–10.3)
CHLORIDE SERPL-SCNC: 110 MMOL/L (ref 98–107)
CO2 SERPL-SCNC: 21 MMOL/L (ref 21–32)
CREAT SERPL-MCNC: 1.98 MG/DL (ref 0.5–1.05)
EGFRCR SERPLBLD CKD-EPI 2021: 25 ML/MIN/1.73M*2
EOSINOPHIL # BLD AUTO: 0.34 X10*3/UL (ref 0–0.4)
EOSINOPHIL NFR BLD AUTO: 5 %
ERYTHROCYTE [DISTWIDTH] IN BLOOD BY AUTOMATED COUNT: 14 % (ref 11.5–14.5)
FERRITIN SERPL-MCNC: 124 NG/ML (ref 8–150)
GLUCOSE SERPL-MCNC: 141 MG/DL (ref 74–99)
HCT VFR BLD AUTO: 32 % (ref 36–46)
HGB BLD-MCNC: 10.5 G/DL (ref 12–16)
IGA SERPL-MCNC: 187 MG/DL (ref 70–400)
IGG SERPL-MCNC: 968 MG/DL (ref 700–1600)
IGM SERPL-MCNC: 20 MG/DL (ref 40–230)
IMM GRANULOCYTES # BLD AUTO: 0.04 X10*3/UL (ref 0–0.5)
IMM GRANULOCYTES NFR BLD AUTO: 0.6 % (ref 0–0.9)
IRON SATN MFR SERPL: 23 % (ref 25–45)
IRON SERPL-MCNC: 62 UG/DL (ref 35–150)
LYMPHOCYTES # BLD AUTO: 2.05 X10*3/UL (ref 0.8–3)
LYMPHOCYTES NFR BLD AUTO: 30.3 %
MCH RBC QN AUTO: 29.9 PG (ref 26–34)
MCHC RBC AUTO-ENTMCNC: 32.8 G/DL (ref 32–36)
MCV RBC AUTO: 91 FL (ref 80–100)
MONOCYTES # BLD AUTO: 0.53 X10*3/UL (ref 0.05–0.8)
MONOCYTES NFR BLD AUTO: 7.8 %
NEUTROPHILS # BLD AUTO: 3.73 X10*3/UL (ref 1.6–5.5)
NEUTROPHILS NFR BLD AUTO: 55.1 %
NRBC BLD-RTO: 0 /100 WBCS (ref 0–0)
PLATELET # BLD AUTO: 190 X10*3/UL (ref 150–450)
POTASSIUM SERPL-SCNC: 4.2 MMOL/L (ref 3.5–5.3)
PROT SERPL-MCNC: 6.9 G/DL (ref 6.4–8.2)
PROT SERPL-MCNC: 7.2 G/DL (ref 6.4–8.2)
RBC # BLD AUTO: 3.51 X10*6/UL (ref 4–5.2)
SODIUM SERPL-SCNC: 141 MMOL/L (ref 136–145)
TIBC SERPL-MCNC: 268 UG/DL (ref 240–445)
UIBC SERPL-MCNC: 206 UG/DL (ref 110–370)
VIT B12 SERPL-MCNC: 429 PG/ML (ref 211–911)
WBC # BLD AUTO: 6.8 X10*3/UL (ref 4.4–11.3)

## 2024-05-08 PROCEDURE — 82728 ASSAY OF FERRITIN: CPT

## 2024-05-08 PROCEDURE — 83010 ASSAY OF HAPTOGLOBIN QUANT: CPT

## 2024-05-08 PROCEDURE — 83540 ASSAY OF IRON: CPT

## 2024-05-08 PROCEDURE — 84155 ASSAY OF PROTEIN SERUM: CPT | Mod: 59

## 2024-05-08 PROCEDURE — 84165 PROTEIN E-PHORESIS SERUM: CPT | Performed by: PATHOLOGY

## 2024-05-08 PROCEDURE — 86320 SERUM IMMUNOELECTROPHORESIS: CPT | Performed by: PATHOLOGY

## 2024-05-08 PROCEDURE — 82784 ASSAY IGA/IGD/IGG/IGM EACH: CPT

## 2024-05-08 PROCEDURE — 36415 COLL VENOUS BLD VENIPUNCTURE: CPT

## 2024-05-08 PROCEDURE — 80053 COMPREHEN METABOLIC PANEL: CPT

## 2024-05-08 PROCEDURE — 86334 IMMUNOFIX E-PHORESIS SERUM: CPT

## 2024-05-08 PROCEDURE — 82607 VITAMIN B-12: CPT

## 2024-05-08 PROCEDURE — 83521 IG LIGHT CHAINS FREE EACH: CPT

## 2024-05-08 PROCEDURE — 82668 ASSAY OF ERYTHROPOIETIN: CPT

## 2024-05-08 PROCEDURE — 85025 COMPLETE CBC W/AUTO DIFF WBC: CPT

## 2024-05-09 LAB
EPO SERPL-ACNC: 28 MU/ML (ref 4–27)
HAPTOGLOB SERPL-MCNC: 208 MG/DL (ref 37–246)
KAPPA LC SERPL-MCNC: 146.58 MG/DL (ref 0.33–1.94)
KAPPA LC/LAMBDA SER: 72.56 {RATIO} (ref 0.26–1.65)
LAMBDA LC SERPL-MCNC: 2.02 MG/DL (ref 0.57–2.63)

## 2024-05-11 LAB
ALBUMIN: 4.1 G/DL (ref 3.4–5)
ALPHA 1 GLOBULIN: 0.3 G/DL (ref 0.2–0.6)
ALPHA 2 GLOBULIN: 0.9 G/DL (ref 0.4–1.1)
BETA GLOBULIN: 0.8 G/DL (ref 0.5–1.2)
GAMMA GLOBULIN: 0.8 G/DL (ref 0.5–1.4)
IMMUNOFIXATION COMMENT: ABNORMAL
M-PROTEIN 1: 0.1 G/DL
PATH REVIEW - SERUM IMMUNOFIXATION: ABNORMAL
PATH REVIEW-SERUM PROTEIN ELECTROPHORESIS: ABNORMAL
PROTEIN ELECTROPHORESIS COMMENT: ABNORMAL

## 2024-05-14 ENCOUNTER — TELEMEDICINE (OUTPATIENT)
Dept: HEMATOLOGY/ONCOLOGY | Facility: HOSPITAL | Age: 79
End: 2024-05-14
Payer: MEDICARE

## 2024-05-14 ENCOUNTER — TELEPHONE (OUTPATIENT)
Dept: ADMISSION | Facility: HOSPITAL | Age: 79
End: 2024-05-14
Payer: MEDICARE

## 2024-05-14 DIAGNOSIS — D64.9 ANEMIA, UNSPECIFIED TYPE: ICD-10-CM

## 2024-05-14 DIAGNOSIS — D47.2 SMOLDERING MULTIPLE MYELOMA: Primary | ICD-10-CM

## 2024-05-14 DIAGNOSIS — S32.010S COMPRESSION FRACTURE OF L1 LUMBAR VERTEBRA, SEQUELA: ICD-10-CM

## 2024-05-14 PROCEDURE — 99443 PR PHYS/QHP TELEPHONE EVALUATION 21-30 MIN: CPT

## 2024-05-14 ASSESSMENT — ENCOUNTER SYMPTOMS
CARDIOVASCULAR NEGATIVE: 1
BACK PAIN: 1
PSYCHIATRIC NEGATIVE: 1
RESPIRATORY NEGATIVE: 1
DIARRHEA: 1
NEUROLOGICAL NEGATIVE: 1
ALLERGIC/IMMUNOLOGIC NEGATIVE: 1
EYES NEGATIVE: 1
HEMATOLOGIC/LYMPHATIC NEGATIVE: 1
ENDOCRINE NEGATIVE: 1
FATIGUE: 1

## 2024-05-14 NOTE — TELEPHONE ENCOUNTER
Pt made aware that a referral was placed for a new nephrologist and that she can call to schedule at anytime, she was appreciative and denied further questions or concerns.

## 2024-05-14 NOTE — TELEPHONE ENCOUNTER
Felipa called and couldn't connect for appointment with Pedro Brody. I secured chat him and he will call her at [phone] but is running behind. She is aware he is running behind and will  her call soon.

## 2024-05-14 NOTE — TELEPHONE ENCOUNTER
The patient called in, wants to relay to Pedro Brody that she looked back in her notes and does not want to see Dr. Valentine, wants a new nephrologist and would like to be seen at [facility].

## 2024-05-14 NOTE — PROGRESS NOTES
Patient ID: Felipa Hood is a 79 y.o. female.  Referring Physician: No referring provider defined for this encounter.  Primary Care Provider: DB Lobo  Date of Service:  5/14/2024    Diagnosis: Kappa Light Chain Smoldering Myeloma     Has a past medical history of aortic stenosis, HTN, CKD 3, DM2, and HLD. Presents as a referral from her nephrologist Dr. Garcia for concern for plasma cell dyscrasia.      Workup:  SPEP (6/29/23): Discrete Kappa FLC not definitively monoclonal  SFLC (6/29/23): Kappa .15mg/dL, FLC ratio 50.73  Spot UPEP (6/29/23): Total protein 258, 1.6% kappa FLC   Osseous survey (8/2/23): negative for lytic lesions however showed osseous demineralization   24 hour urine (8/4/23): Total protein 538, UPEP pending   BMBx (9/7/23):  -- NORMOCELLULAR BONE MARROW (20-30%) WITH MATURING TRILINEAGE HEMATOPOIESIS  INVOLVEMENT BY KAPPA+ PLASMA CELL NEOPLASM (15-20% BY IMMUNOSTAINS), SEE NOTE.     NOTE: The findings are consistent with plasma cell neoplasm. In absence of myeloma defining events, the findings could be consistent with smoldering myeloma. Clinical, radiological and laboratory correlation is suggested.     FISH:   This analysis showed an abnormal result with gain of chromosome 1q and loss of IGH.  The assay did not find loss of 1p or rearrangement of IGH.  Please note, due to the quality of cells there was poor hybridization of probes for TP53 and CEP 3, 7, and 11. Therefore analysis for loss of TP53 and hyperdiploidy of chromosomes 3, 7, and 11 could not be performed.     PET/CT (10/4/23):   1. No evidence of a hypermetabolic osteolytic lesion throughout the  axial and appendicular skeleton to suggest a focal myelomatous  process.  2. Diffuse FDG uptake along the superior endplate of L1 vertebral  body compatible with recent compression fracture.         Medical History:  Aortic stenosis  HTN  CKD stage 3  DM 2  Cataracts  HLD  Pulmonary  nodules  Histoplasmosis  Lower back pain     Surgical History:  Aortic valve replacement  CABG  Lung wedge resection      Social History:  Retired  Former smoker     Family History:   Mother- malignant neoplasm  Father- HTN, lung disease, malignant neoplasm      SUBJECTIVE:  History of Present Illness:  Felipa presents to clinic 5/14/24 for a follow up phone call.    Overall she is doing okay.    She endorses diarrhea over the past week, notes improvement with Imodium.    Has ongoing fatigue which is unchanged. She remains active and is planning a trips to [place] and [place].     Review of Systems   Constitutional:  Positive for fatigue.   HENT: Negative.     Eyes: Negative.    Respiratory: Negative.     Cardiovascular: Negative.    Gastrointestinal:  Positive for diarrhea.   Endocrine: Negative.    Genitourinary: Negative.    Musculoskeletal:  Positive for back pain.   Skin: Negative.    Allergic/Immunologic: Negative.    Neurological: Negative.    Hematological: Negative.    Psychiatric/Behavioral: Negative.       OBJECTIVE:  KPS: Karnofsky Score: 90 - Able to carry on normal activity; minor signs or symptoms of disease    VS:  There were no vitals taken for this visit.  BSA: There is no height or weight on file to calculate BSA.    Laboratory:  The pertinent laboratory results were reviewed and discussed with the patient.    Lab Results   Component Value Date    WBC 6.8 05/08/2024    HCT 32.0 (L) 05/08/2024    HGB 10.5 (L) 05/08/2024     05/08/2024    K 4.2 05/08/2024    CALCIUM 10.1 05/08/2024     05/08/2024    MG 1.80 11/11/2022    ALT 9 05/08/2024    AST 17 05/08/2024    BUN 40 (H) 05/08/2024    CREATININE 1.98 (H) 05/08/2024    PHOS 2.9 06/14/2023    KAPPA 146.58 (H) 05/08/2024    LAMBDA 2.02 05/08/2024    KAPLS 72.56 (H) 05/08/2024    SPEP Aberrant band detected. See immunofixation. 05/08/2024    IEPIN  05/08/2024     Known monoclonal IgG kappa in the gamma region at 0.1 g/dL. Unchanged from the  previous analysis on 2/1/24.     05/08/2024    IGM 20 (L) 05/08/2024     05/08/2024      Note: for a comprehensive list of the patient's lab results, access the Results Review activity.    ASSESSMENT and PLAN:     Plasma Cell Dyscrasia:  - Discrete Kappa FLC not definitively monoclonal noted on SPEP  - Spot UPEP showed 1.6% Kappa FLC M protein  - Kappa .15mg/dL, FLC ratio 50.73  - 24 hour UPEP pending  - Osseous survey negative for lytic lesions   - BMBx showed 15-20% plasma cells consistent with smoldering myeloma  - FISH showed gain of 1q and rearrangement of IGH  - PET/CT negative outside of L1 compression fracture     Renal:  - Follows with Dr. Garcia  - CKD stage 4  - Recent sCr >3, hence workup for plasma cell dyscrasia  - sCr uptredning  - Requested nephrology follow up      Cardiology:  - Follows with Dr. Ring  - History of HTN, Aortic stenosis  - S/p AVR, CABG  - Recent episode of lightheadedness  - Wore Holter monitor     Anemia:  - Hgb 10.4  - Ordered anemia workup for next set of labs  - Taking iron supplement     RTC:  3 months w/ labs prior     JARED Hoyt-CNP

## 2024-05-24 ENCOUNTER — HOSPITAL ENCOUNTER (OUTPATIENT)
Dept: CARDIOLOGY | Facility: HOSPITAL | Age: 79
Discharge: HOME | End: 2024-05-24
Payer: MEDICARE

## 2024-05-24 DIAGNOSIS — Z95.2 STATUS POST AORTIC VALVE REPLACEMENT: ICD-10-CM

## 2024-05-24 LAB
AORTIC VALVE MEAN GRADIENT: 8.5 MMHG
AORTIC VALVE PEAK VELOCITY: 1.96 M/S
AV PEAK GRADIENT: 15.4 MMHG
AVA (PEAK VEL): 1.35 CM2
AVA (VTI): 1.55 CM2
EJECTION FRACTION APICAL 4 CHAMBER: 67.9
LEFT VENTRICLE INTERNAL DIMENSION DIASTOLE: 3.94 CM (ref 3.5–6)
LEFT VENTRICULAR OUTFLOW TRACT DIAMETER: 1.88 CM
LV EJECTION FRACTION BIPLANE: 68 %
MITRAL VALVE E/A RATIO: 0.71
MITRAL VALVE E/E' RATIO: 19.97
RIGHT VENTRICLE FREE WALL PEAK S': 8 CM/S
TRICUSPID ANNULAR PLANE SYSTOLIC EXCURSION: 1.5 CM

## 2024-05-24 PROCEDURE — 93306 TTE W/DOPPLER COMPLETE: CPT

## 2024-05-24 PROCEDURE — 93306 TTE W/DOPPLER COMPLETE: CPT | Performed by: INTERNAL MEDICINE

## 2024-05-30 ENCOUNTER — HOSPITAL ENCOUNTER (OUTPATIENT)
Dept: RADIOLOGY | Facility: CLINIC | Age: 79
End: 2024-05-30
Payer: MEDICARE

## 2024-07-18 DIAGNOSIS — N18.4 CHRONIC KIDNEY DISEASE, STAGE 4 (SEVERE) (MULTI): ICD-10-CM

## 2024-07-18 RX ORDER — CHLORTHALIDONE 25 MG/1
25 TABLET ORAL DAILY
Qty: 90 TABLET | Refills: 3 | OUTPATIENT
Start: 2024-07-18

## 2024-08-05 ENCOUNTER — LAB (OUTPATIENT)
Dept: LAB | Facility: HOSPITAL | Age: 79
End: 2024-08-05
Payer: MEDICARE

## 2024-08-05 DIAGNOSIS — D47.2 SMOLDERING MULTIPLE MYELOMA: ICD-10-CM

## 2024-08-05 LAB
ALBUMIN SERPL BCP-MCNC: 4.3 G/DL (ref 3.4–5)
ALP SERPL-CCNC: 70 U/L (ref 33–136)
ALT SERPL W P-5'-P-CCNC: 7 U/L (ref 7–45)
ANION GAP SERPL CALC-SCNC: 14 MMOL/L (ref 10–20)
AST SERPL W P-5'-P-CCNC: 14 U/L (ref 9–39)
BASOPHILS # BLD AUTO: 0.09 X10*3/UL (ref 0–0.1)
BASOPHILS NFR BLD AUTO: 1.1 %
BILIRUB SERPL-MCNC: 0.6 MG/DL (ref 0–1.2)
BUN SERPL-MCNC: 39 MG/DL (ref 6–23)
CALCIUM SERPL-MCNC: 9.6 MG/DL (ref 8.6–10.3)
CHLORIDE SERPL-SCNC: 109 MMOL/L (ref 98–107)
CO2 SERPL-SCNC: 20 MMOL/L (ref 21–32)
CREAT SERPL-MCNC: 1.66 MG/DL (ref 0.5–1.05)
EGFRCR SERPLBLD CKD-EPI 2021: 31 ML/MIN/1.73M*2
EOSINOPHIL # BLD AUTO: 0.42 X10*3/UL (ref 0–0.4)
EOSINOPHIL NFR BLD AUTO: 5 %
ERYTHROCYTE [DISTWIDTH] IN BLOOD BY AUTOMATED COUNT: 13.7 % (ref 11.5–14.5)
GLUCOSE SERPL-MCNC: 127 MG/DL (ref 74–99)
HCT VFR BLD AUTO: 31.4 % (ref 36–46)
HGB BLD-MCNC: 10.3 G/DL (ref 12–16)
IGA SERPL-MCNC: 193 MG/DL (ref 70–400)
IGG SERPL-MCNC: 917 MG/DL (ref 700–1600)
IGM SERPL-MCNC: 20 MG/DL (ref 40–230)
IMM GRANULOCYTES # BLD AUTO: 0.02 X10*3/UL (ref 0–0.5)
IMM GRANULOCYTES NFR BLD AUTO: 0.2 % (ref 0–0.9)
LYMPHOCYTES # BLD AUTO: 2.06 X10*3/UL (ref 0.8–3)
LYMPHOCYTES NFR BLD AUTO: 24.5 %
MCH RBC QN AUTO: 30.6 PG (ref 26–34)
MCHC RBC AUTO-ENTMCNC: 32.8 G/DL (ref 32–36)
MCV RBC AUTO: 93 FL (ref 80–100)
MONOCYTES # BLD AUTO: 0.74 X10*3/UL (ref 0.05–0.8)
MONOCYTES NFR BLD AUTO: 8.8 %
NEUTROPHILS # BLD AUTO: 5.07 X10*3/UL (ref 1.6–5.5)
NEUTROPHILS NFR BLD AUTO: 60.4 %
NRBC BLD-RTO: 0 /100 WBCS (ref 0–0)
PLATELET # BLD AUTO: 186 X10*3/UL (ref 150–450)
POTASSIUM SERPL-SCNC: 3.9 MMOL/L (ref 3.5–5.3)
PROT SERPL-MCNC: 6.6 G/DL (ref 6.4–8.2)
PROT SERPL-MCNC: 6.9 G/DL (ref 6.4–8.2)
RBC # BLD AUTO: 3.37 X10*6/UL (ref 4–5.2)
SODIUM SERPL-SCNC: 139 MMOL/L (ref 136–145)
WBC # BLD AUTO: 8.4 X10*3/UL (ref 4.4–11.3)

## 2024-08-05 PROCEDURE — 83521 IG LIGHT CHAINS FREE EACH: CPT

## 2024-08-05 PROCEDURE — 82784 ASSAY IGA/IGD/IGG/IGM EACH: CPT

## 2024-08-05 PROCEDURE — 86320 SERUM IMMUNOELECTROPHORESIS: CPT | Performed by: PATHOLOGY

## 2024-08-05 PROCEDURE — 36415 COLL VENOUS BLD VENIPUNCTURE: CPT

## 2024-08-05 PROCEDURE — 84155 ASSAY OF PROTEIN SERUM: CPT

## 2024-08-05 PROCEDURE — 84165 PROTEIN E-PHORESIS SERUM: CPT | Performed by: PATHOLOGY

## 2024-08-05 PROCEDURE — 86334 IMMUNOFIX E-PHORESIS SERUM: CPT

## 2024-08-05 PROCEDURE — 85025 COMPLETE CBC W/AUTO DIFF WBC: CPT

## 2024-08-05 PROCEDURE — 84075 ASSAY ALKALINE PHOSPHATASE: CPT

## 2024-08-07 LAB
KAPPA LC SERPL-MCNC: 126.41 MG/DL (ref 0.33–1.94)
KAPPA LC/LAMBDA SER: 62.27 {RATIO} (ref 0.26–1.65)
LAMBDA LC SERPL-MCNC: 2.03 MG/DL (ref 0.57–2.63)

## 2024-08-09 LAB
ALBUMIN: 3.9 G/DL (ref 3.4–5)
ALPHA 1 GLOBULIN: 0.3 G/DL (ref 0.2–0.6)
ALPHA 2 GLOBULIN: 0.9 G/DL (ref 0.4–1.1)
BETA GLOBULIN: 0.7 G/DL (ref 0.5–1.2)
GAMMA GLOBULIN: 0.8 G/DL (ref 0.5–1.4)
IMMUNOFIXATION COMMENT: ABNORMAL
M-PROTEIN 1: 0.1 G/DL
PATH REVIEW - SERUM IMMUNOFIXATION: ABNORMAL
PATH REVIEW-SERUM PROTEIN ELECTROPHORESIS: ABNORMAL
PROTEIN ELECTROPHORESIS COMMENT: ABNORMAL

## 2024-08-13 ENCOUNTER — APPOINTMENT (OUTPATIENT)
Dept: HEMATOLOGY/ONCOLOGY | Facility: HOSPITAL | Age: 79
End: 2024-08-13
Payer: MEDICARE

## 2024-08-14 ENCOUNTER — APPOINTMENT (OUTPATIENT)
Dept: RADIOLOGY | Facility: CLINIC | Age: 79
End: 2024-08-14
Payer: MEDICARE

## 2024-08-15 ENCOUNTER — TELEMEDICINE (OUTPATIENT)
Dept: HEMATOLOGY/ONCOLOGY | Facility: HOSPITAL | Age: 79
End: 2024-08-15
Payer: MEDICARE

## 2024-08-15 DIAGNOSIS — S32.010S COMPRESSION FRACTURE OF L1 LUMBAR VERTEBRA, SEQUELA: ICD-10-CM

## 2024-08-15 DIAGNOSIS — D64.9 ANEMIA, UNSPECIFIED TYPE: ICD-10-CM

## 2024-08-15 DIAGNOSIS — D47.2 SMOLDERING MULTIPLE MYELOMA: Primary | ICD-10-CM

## 2024-08-15 DIAGNOSIS — N18.9 CHRONIC KIDNEY DISEASE, UNSPECIFIED CKD STAGE: ICD-10-CM

## 2024-08-15 PROCEDURE — 99443 PR PHYS/QHP TELEPHONE EVALUATION 21-30 MIN: CPT

## 2024-08-15 RX ORDER — DICLOFENAC SODIUM 10 MG/G
4 GEL TOPICAL 4 TIMES DAILY PRN
Qty: 450 G | Refills: 3 | Status: SHIPPED | OUTPATIENT
Start: 2024-08-15

## 2024-08-15 ASSESSMENT — ENCOUNTER SYMPTOMS
RESPIRATORY NEGATIVE: 1
DIARRHEA: 1
NEUROLOGICAL NEGATIVE: 1
EYES NEGATIVE: 1
ENDOCRINE NEGATIVE: 1
BACK PAIN: 1
ALLERGIC/IMMUNOLOGIC NEGATIVE: 1
PSYCHIATRIC NEGATIVE: 1
CARDIOVASCULAR NEGATIVE: 1
HEMATOLOGIC/LYMPHATIC NEGATIVE: 1
FATIGUE: 1

## 2024-09-16 ENCOUNTER — APPOINTMENT (OUTPATIENT)
Dept: NEPHROLOGY | Facility: CLINIC | Age: 79
End: 2024-09-16
Payer: MEDICARE

## 2024-09-16 VITALS
WEIGHT: 164 LBS | SYSTOLIC BLOOD PRESSURE: 153 MMHG | DIASTOLIC BLOOD PRESSURE: 56 MMHG | HEIGHT: 63 IN | HEART RATE: 55 BPM | BODY MASS INDEX: 29.06 KG/M2

## 2024-09-16 DIAGNOSIS — N18.32 TYPE 2 DIABETES MELLITUS WITH STAGE 3B CHRONIC KIDNEY DISEASE, WITHOUT LONG-TERM CURRENT USE OF INSULIN (MULTI): ICD-10-CM

## 2024-09-16 DIAGNOSIS — I10 PRIMARY HYPERTENSION: ICD-10-CM

## 2024-09-16 DIAGNOSIS — E11.22 TYPE 2 DIABETES MELLITUS WITH STAGE 3B CHRONIC KIDNEY DISEASE, WITHOUT LONG-TERM CURRENT USE OF INSULIN (MULTI): ICD-10-CM

## 2024-09-16 DIAGNOSIS — D47.2 SMOLDERING MULTIPLE MYELOMA: ICD-10-CM

## 2024-09-16 DIAGNOSIS — N18.32 STAGE 3B CHRONIC KIDNEY DISEASE (MULTI): Primary | ICD-10-CM

## 2024-09-16 PROCEDURE — 3078F DIAST BP <80 MM HG: CPT | Performed by: INTERNAL MEDICINE

## 2024-09-16 PROCEDURE — 1036F TOBACCO NON-USER: CPT | Performed by: INTERNAL MEDICINE

## 2024-09-16 PROCEDURE — 99214 OFFICE O/P EST MOD 30 MIN: CPT | Performed by: INTERNAL MEDICINE

## 2024-09-16 PROCEDURE — 1159F MED LIST DOCD IN RCRD: CPT | Performed by: INTERNAL MEDICINE

## 2024-09-16 PROCEDURE — 3077F SYST BP >= 140 MM HG: CPT | Performed by: INTERNAL MEDICINE

## 2024-09-16 RX ORDER — UBIDECARENONE 75 MG
500 CAPSULE ORAL DAILY
COMMUNITY

## 2024-09-16 RX ORDER — DIPHENOXYLATE HYDROCHLORIDE AND ATROPINE SULFATE 2.5; .025 MG/1; MG/1
1 TABLET ORAL 4 TIMES DAILY PRN
COMMUNITY

## 2024-09-16 NOTE — PROGRESS NOTES
Nephrology Outpatient Follow-up Patient Note    Chief Concern:  Follow-up for CKD    History Of Present Illness   Felipa Hood is a 79 y.o. 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  Patient Active Problem List   Diagnosis    Abnormal finding on CT scan    Anemia    Adenomatous polyp of colon    Age-related nuclear cataract of both eyes    Cortical senile cataract of both eyes    Aortic cusp regurgitation    Aortic insufficiency with aortic stenosis    Aortic stenosis, moderate    ASCUS with positive high risk HPV cervical    Atherosclerosis of native coronary artery of native heart without angina pectoris    Benign essential hypertension    Bicuspid aortic valve (HHS-HCC)    Carotid bruit    Chronic obstructive pulmonary disease, unspecified (Multi)    Diabetic peripheral neuropathy (Multi)    Diabetes mellitus due to underlying condition without complication, without long-term current use of insulin (Multi)    DMII (diabetes mellitus, type 2) (Multi)    Early dry stage nonexudative age-related macular degeneration of both eyes    Heart murmur    Histoplasmosis    Hyperkalemia    Hyperlipidemia    Intention tremor    Low back pain    Nocturia    Mild vitamin D  "deficiency    Nonproliferative diabetic retinopathy (Multi)    Perianal fistula    Pleural thickening    Pre-ulcerative corn or callous    Presence of aortocoronary bypass graft    Pure hypercholesterolemia    S/P AVR (aortic valve replacement)    S/P CABG x 1    Stage 3 chronic kidney disease (Multi)    Hypertensive chronic kidney disease w stg 1-4/unsp chr kdny    Tobacco use disorder    Trichomoniasis    Tubular adenoma of colon    Urinary urgency    Weight loss    Acute posthemorrhagic anemia    Athscl heart disease of native coronary artery w/o ang pctrs    Dysphagia, unspecified    Histoplasmosis, unspecified    Solitary pulmonary nodule    Personal history of nicotine dependence    Presence of other heart-valve replacement    Unspecified osteoarthritis, unspecified site    Type 2 diabetes mellitus with diabetic chronic kidney disease (Multi)    Dvrtclos of intest, part unsp, 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     Social History  She has no history on file for tobacco use, alcohol use, and drug use.    Family History  Family History   Problem Relation Name Age of Onset    Other (Malignant neoplasm) Mother      Hypertension Father      Lung disease Father      Other (Malignant neoplasm) Father      Other (family history of colon cancer) Other          Allergies  Iodinated contrast media    Review of Systems  A 12-point review of systems was performed and noted be negative except for that which was mentioned in the history of present illness     Last Recorded Vitals  /56   Pulse 55   Ht 1.6 m (5' 3\")   Wt 74.4 kg (164 lb)   BMI 29.05 kg/m²      Physical Exam:  Constitutional:  No acute distress  Respiration:  Breathing comfortably, " no stridor  Cardiovascular:  No clubbing/cyanosis/edema in hands  Eyes:  EOM intact, sclera normal  Neuro:  Alert and oriented times 3, Cranial nerves II-XII grossly intact and symmetric bilaterally. No asterixis  Head and Face:  Symmetric facial features, no masses or lesions, sinuses non-tender to palpation  Neck/Lymph:  No LAD, no thyroid masses, trachea midline, No JVD  Skin:  no visible rash or scratching marks   Psych:  Alert and oriented with appropriate mood and affect      Medications:  Medication Documentation Review Audit    **Prior to Admission medications have not yet been reviewed**         Relevant Results Recent labs reviewed in the EMR.  Lab Results   Component Value Date    HGB 10.3 (L) 08/05/2024    HGB 10.5 (L) 05/08/2024    HGB 10.4 (L) 02/01/2024    MCV 93 08/05/2024    MCV 91 05/08/2024    MCV 93 02/01/2024     08/05/2024     05/08/2024     02/01/2024       Lab Results   Component Value Date    FERRITIN 124 05/08/2024    IRON 62 05/08/2024       Lab Results   Component Value Date     08/05/2024    K 3.9 08/05/2024     (H) 08/05/2024    BUN 39 (H) 08/05/2024    CREATININE 1.66 (H) 08/05/2024       Imaging:  I personally reviewed then and this is my impression:        Assessment/Plan   1. Smoldering multiple myeloma    2. Stage 3b chronic kidney disease (Multi)  - Stable proteinuric CKD, with DM diet control+wt loss, also with SMM, stable Kappa levels. On RASi, will add SGLT2i and to get labs in 4-6  - Vitamin D 25-Hydroxy,Total (for eval of Vitamin D levels); Future  - Parathyroid Hormone, Intact; Future  - Albumin-Creatinine Ratio, Urine Random; Future  - Creatinine, Urine Random; Future  - Urinalysis with Reflex Microscopic; Future  - empagliflozin (Jardiance) 10 mg; Take 1 tablet (10 mg) by mouth once daily.  Dispense: 30 tablet; Refill: 11  - Renal function panel; Future  - Follow Up In Nephrology; Future    3. Primary hypertension  - suboptimal control,  will reassess next visit   - Follow Up In Nephrology; Future    4. Type 2 diabetes mellitus with stage 3b chronic kidney disease, without long-term current use of insulin (Multi)  - empagliflozin (Jardiance) 10 mg; Take 1 tablet (10 mg) by mouth once daily.  Dispense: 30 tablet; Refill: 11     - RTO 2 months    Faraz Huang MD  Nephrology and Hypertension

## 2024-11-13 DIAGNOSIS — N18.4 CHRONIC KIDNEY DISEASE, STAGE 4 (SEVERE) (MULTI): ICD-10-CM

## 2024-11-14 ENCOUNTER — TELEPHONE (OUTPATIENT)
Dept: ADMISSION | Facility: HOSPITAL | Age: 79
End: 2024-11-14
Payer: MEDICARE

## 2024-11-14 ENCOUNTER — LAB (OUTPATIENT)
Dept: LAB | Facility: HOSPITAL | Age: 79
End: 2024-11-14
Payer: MEDICARE

## 2024-11-14 DIAGNOSIS — D47.2 SMOLDERING MULTIPLE MYELOMA: ICD-10-CM

## 2024-11-14 DIAGNOSIS — N18.32 STAGE 3B CHRONIC KIDNEY DISEASE (MULTI): ICD-10-CM

## 2024-11-14 LAB
25(OH)D3 SERPL-MCNC: 61 NG/ML (ref 30–100)
ALBUMIN SERPL BCP-MCNC: 4.7 G/DL (ref 3.4–5)
ALP SERPL-CCNC: 80 U/L (ref 33–136)
ALT SERPL W P-5'-P-CCNC: 9 U/L (ref 7–45)
ANION GAP SERPL CALC-SCNC: 14 MMOL/L (ref 10–20)
APPEARANCE UR: CLEAR
AST SERPL W P-5'-P-CCNC: 16 U/L (ref 9–39)
BASOPHILS # BLD AUTO: 0.11 X10*3/UL (ref 0–0.1)
BASOPHILS NFR BLD AUTO: 1.2 %
BILIRUB SERPL-MCNC: 0.5 MG/DL (ref 0–1.2)
BILIRUB UR STRIP.AUTO-MCNC: NEGATIVE MG/DL
BUN SERPL-MCNC: 40 MG/DL (ref 6–23)
CALCIUM SERPL-MCNC: 10.2 MG/DL (ref 8.6–10.3)
CHLORIDE SERPL-SCNC: 106 MMOL/L (ref 98–107)
CO2 SERPL-SCNC: 24 MMOL/L (ref 21–32)
COLOR UR: ABNORMAL
CREAT SERPL-MCNC: 1.95 MG/DL (ref 0.5–1.05)
CREAT UR-MCNC: 61.1 MG/DL (ref 20–320)
EGFRCR SERPLBLD CKD-EPI 2021: 26 ML/MIN/1.73M*2
EOSINOPHIL # BLD AUTO: 0.49 X10*3/UL (ref 0–0.4)
EOSINOPHIL NFR BLD AUTO: 5.2 %
ERYTHROCYTE [DISTWIDTH] IN BLOOD BY AUTOMATED COUNT: 13.4 % (ref 11.5–14.5)
GLUCOSE SERPL-MCNC: 128 MG/DL (ref 74–99)
GLUCOSE UR STRIP.AUTO-MCNC: ABNORMAL MG/DL
HCT VFR BLD AUTO: 36.4 % (ref 36–46)
HGB BLD-MCNC: 11.6 G/DL (ref 12–16)
HYALINE CASTS #/AREA URNS AUTO: ABNORMAL /LPF
IGA SERPL-MCNC: 230 MG/DL (ref 70–400)
IGG SERPL-MCNC: 1080 MG/DL (ref 700–1600)
IGM SERPL-MCNC: 24 MG/DL (ref 40–230)
IMM GRANULOCYTES # BLD AUTO: 0.06 X10*3/UL (ref 0–0.5)
IMM GRANULOCYTES NFR BLD AUTO: 0.6 % (ref 0–0.9)
KETONES UR STRIP.AUTO-MCNC: NEGATIVE MG/DL
LEUKOCYTE ESTERASE UR QL STRIP.AUTO: NEGATIVE
LYMPHOCYTES # BLD AUTO: 2.2 X10*3/UL (ref 0.8–3)
LYMPHOCYTES NFR BLD AUTO: 23.3 %
MCH RBC QN AUTO: 30.1 PG (ref 26–34)
MCHC RBC AUTO-ENTMCNC: 31.9 G/DL (ref 32–36)
MCV RBC AUTO: 95 FL (ref 80–100)
MICROALBUMIN UR-MCNC: 683.4 MG/L
MICROALBUMIN/CREAT UR: 1118.5 UG/MG CREAT
MONOCYTES # BLD AUTO: 0.76 X10*3/UL (ref 0.05–0.8)
MONOCYTES NFR BLD AUTO: 8.1 %
NEUTROPHILS # BLD AUTO: 5.81 X10*3/UL (ref 1.6–5.5)
NEUTROPHILS NFR BLD AUTO: 61.6 %
NITRITE UR QL STRIP.AUTO: NEGATIVE
NRBC BLD-RTO: 0 /100 WBCS (ref 0–0)
PH UR STRIP.AUTO: 6.5 [PH]
PHOSPHATE SERPL-MCNC: 4.3 MG/DL (ref 2.5–4.9)
PLATELET # BLD AUTO: 197 X10*3/UL (ref 150–450)
POTASSIUM SERPL-SCNC: 5.1 MMOL/L (ref 3.5–5.3)
PROT SERPL-MCNC: 7.4 G/DL (ref 6.4–8.2)
PROT SERPL-MCNC: 7.7 G/DL (ref 6.4–8.2)
PROT UR STRIP.AUTO-MCNC: ABNORMAL MG/DL
PTH-INTACT SERPL-MCNC: 133.8 PG/ML (ref 18.5–88)
RBC # BLD AUTO: 3.85 X10*6/UL (ref 4–5.2)
RBC # UR STRIP.AUTO: NEGATIVE /UL
RBC #/AREA URNS AUTO: ABNORMAL /HPF
SODIUM SERPL-SCNC: 139 MMOL/L (ref 136–145)
SP GR UR STRIP.AUTO: 1.01
SQUAMOUS #/AREA URNS AUTO: ABNORMAL /HPF
UROBILINOGEN UR STRIP.AUTO-MCNC: NORMAL MG/DL
WBC # BLD AUTO: 9.4 X10*3/UL (ref 4.4–11.3)
WBC #/AREA URNS AUTO: ABNORMAL /HPF

## 2024-11-14 PROCEDURE — 84155 ASSAY OF PROTEIN SERUM: CPT

## 2024-11-14 PROCEDURE — 82784 ASSAY IGA/IGD/IGG/IGM EACH: CPT

## 2024-11-14 PROCEDURE — 81001 URINALYSIS AUTO W/SCOPE: CPT

## 2024-11-14 PROCEDURE — 83521 IG LIGHT CHAINS FREE EACH: CPT

## 2024-11-14 PROCEDURE — 84165 PROTEIN E-PHORESIS SERUM: CPT | Performed by: STUDENT IN AN ORGANIZED HEALTH CARE EDUCATION/TRAINING PROGRAM

## 2024-11-14 PROCEDURE — 36415 COLL VENOUS BLD VENIPUNCTURE: CPT

## 2024-11-14 PROCEDURE — 80053 COMPREHEN METABOLIC PANEL: CPT

## 2024-11-14 PROCEDURE — 82043 UR ALBUMIN QUANTITATIVE: CPT

## 2024-11-14 PROCEDURE — 83970 ASSAY OF PARATHORMONE: CPT

## 2024-11-14 PROCEDURE — 85025 COMPLETE CBC W/AUTO DIFF WBC: CPT

## 2024-11-14 PROCEDURE — 84100 ASSAY OF PHOSPHORUS: CPT

## 2024-11-14 PROCEDURE — 86334 IMMUNOFIX E-PHORESIS SERUM: CPT

## 2024-11-14 PROCEDURE — 82306 VITAMIN D 25 HYDROXY: CPT

## 2024-11-14 PROCEDURE — 86320 SERUM IMMUNOELECTROPHORESIS: CPT | Performed by: STUDENT IN AN ORGANIZED HEALTH CARE EDUCATION/TRAINING PROGRAM

## 2024-11-14 NOTE — TELEPHONE ENCOUNTER
Pt called to make sure her labs were in [facility]- they are. She will have them drawn today. No further needs at this time.

## 2024-11-15 LAB
KAPPA LC SERPL-MCNC: 115.61 MG/DL (ref 0.33–1.94)
KAPPA LC/LAMBDA SER: 54.02 {RATIO} (ref 0.26–1.65)
LAMBDA LC SERPL-MCNC: 2.14 MG/DL (ref 0.57–2.63)

## 2024-11-18 ENCOUNTER — TELEMEDICINE (OUTPATIENT)
Dept: HEMATOLOGY/ONCOLOGY | Facility: CLINIC | Age: 79
End: 2024-11-18
Payer: MEDICARE

## 2024-11-18 DIAGNOSIS — N18.9 CHRONIC KIDNEY DISEASE, UNSPECIFIED CKD STAGE: ICD-10-CM

## 2024-11-18 DIAGNOSIS — D47.2 SMOLDERING MULTIPLE MYELOMA: Primary | ICD-10-CM

## 2024-11-18 DIAGNOSIS — D64.9 ANEMIA, UNSPECIFIED TYPE: ICD-10-CM

## 2024-11-18 DIAGNOSIS — S32.010S COMPRESSION FRACTURE OF L1 LUMBAR VERTEBRA, SEQUELA: ICD-10-CM

## 2024-11-18 LAB
ALBUMIN: 4.3 G/DL (ref 3.4–5)
ALPHA 1 GLOBULIN: 0.3 G/DL (ref 0.2–0.6)
ALPHA 2 GLOBULIN: 1 G/DL (ref 0.4–1.1)
BETA GLOBULIN: 0.8 G/DL (ref 0.5–1.2)
GAMMA GLOBULIN: 0.9 G/DL (ref 0.5–1.4)
IMMUNOFIXATION COMMENT: ABNORMAL
M-PROTEIN 1: 0.1 G/DL
PATH REVIEW - SERUM IMMUNOFIXATION: ABNORMAL
PATH REVIEW-SERUM PROTEIN ELECTROPHORESIS: ABNORMAL
PROTEIN ELECTROPHORESIS COMMENT: ABNORMAL

## 2024-11-18 PROCEDURE — 99443 PR PHYS/QHP TELEPHONE EVALUATION 21-30 MIN: CPT

## 2024-11-18 ASSESSMENT — ENCOUNTER SYMPTOMS
ENDOCRINE NEGATIVE: 1
GASTROINTESTINAL NEGATIVE: 1
EYES NEGATIVE: 1
CARDIOVASCULAR NEGATIVE: 1
PSYCHIATRIC NEGATIVE: 1
RESPIRATORY NEGATIVE: 1
ALLERGIC/IMMUNOLOGIC NEGATIVE: 1
FATIGUE: 1
HEMATOLOGIC/LYMPHATIC NEGATIVE: 1
NEUROLOGICAL NEGATIVE: 1
BACK PAIN: 1

## 2024-11-18 NOTE — PROGRESS NOTES
Patient ID: Felipa Hood is a 79 y.o. female.  Referring Physician: No referring provider defined for this encounter.  Primary Care Provider: DB Lobo  Date of Service:  11/18/2024    Virtual or Telephone Consent    A telephone visit (audio only) between the patient (at the originating site) and the provider (at the distant site) was utilized to provide this telehealth service.   Verbal consent was requested and obtained from Felipa Hood on this date, 11/18/24 for a telehealth visit.     Diagnosis: Kappa Light Chain Smoldering Myeloma     Has a past medical history of aortic stenosis, HTN, CKD 3, DM2, and HLD. Presents as a referral from her nephrologist Dr. Garcia for concern for plasma cell dyscrasia.      Workup:  SPEP (6/29/23): Discrete Kappa FLC not definitively monoclonal  SFLC (6/29/23): Kappa .15mg/dL, FLC ratio 50.73  Spot UPEP (6/29/23): Total protein 258, 1.6% kappa FLC   Osseous survey (8/2/23): negative for lytic lesions however showed osseous demineralization   24 hour urine (8/4/23): Total protein 538, UPEP pending   BMBx (9/7/23):  -- NORMOCELLULAR BONE MARROW (20-30%) WITH MATURING TRILINEAGE HEMATOPOIESIS  INVOLVEMENT BY KAPPA+ PLASMA CELL NEOPLASM (15-20% BY IMMUNOSTAINS), SEE NOTE.     NOTE: The findings are consistent with plasma cell neoplasm. In absence of myeloma defining events, the findings could be consistent with smoldering myeloma. Clinical, radiological and laboratory correlation is suggested.     FISH:   This analysis showed an abnormal result with gain of chromosome 1q and loss of IGH.  The assay did not find loss of 1p or rearrangement of IGH.  Please note, due to the quality of cells there was poor hybridization of probes for TP53 and CEP 3, 7, and 11. Therefore analysis for loss of TP53 and hyperdiploidy of chromosomes 3, 7, and 11 could not be performed.     PET/CT (10/4/23):   1. No evidence of a hypermetabolic osteolytic lesion throughout  the  axial and appendicular skeleton to suggest a focal myelomatous  process.  2. Diffuse FDG uptake along the superior endplate of L1 vertebral  body compatible with recent compression fracture.         Medical History:  Aortic stenosis  HTN  CKD stage 3  DM 2  Cataracts  HLD  Pulmonary nodules  Histoplasmosis  Lower back pain     Surgical History:  Aortic valve replacement  CABG  Lung wedge resection      Social History:  Retired  Former smoker     Family History:   Mother- malignant neoplasm  Father- HTN, lung disease, malignant neoplasm      SUBJECTIVE:  History of Present Illness:  Felipa presents to clinic 11/18/24 for a follow up phone call.    Overall she is doing well.    Had a fabulous trip to [facility], the [place], and the [facility].    Has plans to travel to [facility] for [employer].             Review of Systems   Constitutional:  Positive for fatigue.   HENT: Negative.     Eyes: Negative.    Respiratory: Negative.     Cardiovascular: Negative.    Gastrointestinal: Negative.    Endocrine: Negative.    Genitourinary: Negative.    Musculoskeletal:  Positive for back pain.   Skin: Negative.    Allergic/Immunologic: Negative.    Neurological: Negative.    Hematological: Negative.    Psychiatric/Behavioral: Negative.       OBJECTIVE:  KPS: Karnofsky Score: 90 - Able to carry on normal activity; minor signs or symptoms of disease    VS:  There were no vitals taken for this visit.  BSA: There is no height or weight on file to calculate BSA.    Laboratory:  The pertinent laboratory results were reviewed and discussed with the patient.    Lab Results   Component Value Date    WBC 9.4 11/14/2024    HCT 36.4 11/14/2024    HGB 11.6 (L) 11/14/2024     11/14/2024    K 5.1 11/14/2024    CALCIUM 10.2 11/14/2024     11/14/2024    MG 1.80 11/11/2022    ALT 9 11/14/2024    AST 16 11/14/2024    BUN 40 (H) 11/14/2024    CREATININE 1.95 (H) 11/14/2024    PHOS 4.3 11/14/2024    KAPPA 115.61 (H) 11/14/2024     LAMBDA 2.14 11/14/2024    KAPLS 54.02 (H) 11/14/2024    SPEP Aberrant band detected. See immunofixation. 08/05/2024    IEPIN  08/05/2024 8/5/24 Known monoclonal free kappa in the gamma region at 0.1 g/dL. Last detected on 5/8/24 as monoclonal IgG kappa at 0.1 g/dL.    IGG 1,080 11/14/2024    IGM 24 (L) 11/14/2024     11/14/2024      Note: for a comprehensive list of the patient's lab results, access the Results Review activity.    ASSESSMENT and PLAN:     Smoldering Multiple Myeloma:  - Discrete Kappa FLC not definitively monoclonal noted on SPEP  - Spot UPEP showed 1.6% Kappa FLC M protein  - Kappa .15mg/dL, FLC ratio 50.73  - 24 hour UPEP pending  - Osseous survey negative for lytic lesions   - BMBx showed 15-20% plasma cells consistent with smoldering myeloma  - FISH showed gain of 1q and rearrangement of IGH  - PET/CT negative outside of L1 compression fracture  - Labs stable      Renal:  - CKD stage 4  - Recent sCr >3, hence workup for plasma cell dyscrasia  - sCr stable   - Follows w/ Dr. Huang     Cardiology:  - Follows with Dr. Ring  - History of HTN, Aortic stenosis  - S/p AVR, CABG  - S/p valve replacement     Anemia:  - Hgb 11.6  - Taking iron supplement daily    RTC:  3 months virtual w/ labs prior     I spent 22 minutes reviewing, planning, and counseling with the pt.    Arjun Brody APRN-CNP

## 2024-11-20 RX ORDER — CHLORTHALIDONE 25 MG/1
25 TABLET ORAL DAILY
Refills: 0 | OUTPATIENT
Start: 2024-11-20

## 2025-01-06 ENCOUNTER — APPOINTMENT (OUTPATIENT)
Dept: NEPHROLOGY | Facility: CLINIC | Age: 80
End: 2025-01-06
Payer: MEDICARE

## 2025-01-06 VITALS
DIASTOLIC BLOOD PRESSURE: 47 MMHG | WEIGHT: 164 LBS | HEART RATE: 52 BPM | SYSTOLIC BLOOD PRESSURE: 129 MMHG | HEIGHT: 63 IN | BODY MASS INDEX: 29.06 KG/M2

## 2025-01-06 DIAGNOSIS — D47.2 SMOLDERING MULTIPLE MYELOMA: ICD-10-CM

## 2025-01-06 DIAGNOSIS — N18.32 STAGE 3B CHRONIC KIDNEY DISEASE (MULTI): Primary | ICD-10-CM

## 2025-01-06 DIAGNOSIS — I10 PRIMARY HYPERTENSION: ICD-10-CM

## 2025-01-06 PROCEDURE — 1159F MED LIST DOCD IN RCRD: CPT | Performed by: INTERNAL MEDICINE

## 2025-01-06 PROCEDURE — 99214 OFFICE O/P EST MOD 30 MIN: CPT | Performed by: INTERNAL MEDICINE

## 2025-01-06 PROCEDURE — 3074F SYST BP LT 130 MM HG: CPT | Performed by: INTERNAL MEDICINE

## 2025-01-06 PROCEDURE — 3078F DIAST BP <80 MM HG: CPT | Performed by: INTERNAL MEDICINE

## 2025-01-06 NOTE — PROGRESS NOTES
Nephrology Outpatient Follow-up Patient Note    Chief Concern:  Follow-up for CKD    History Of Present Illness   Felipa Hood is a 79 y.o. 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  Patient Active Problem List   Diagnosis    Abnormal finding on CT scan    Anemia    Adenomatous polyp of colon    Age-related nuclear cataract of both eyes    Cortical senile cataract of both eyes    Aortic cusp regurgitation    Aortic insufficiency with aortic stenosis    Aortic stenosis, moderate    ASCUS with positive high risk HPV cervical    Atherosclerosis of native coronary artery of native heart without angina pectoris    Benign essential hypertension    Bicuspid aortic valve    Carotid bruit    Chronic obstructive pulmonary disease, unspecified    Diabetic peripheral neuropathy (Multi)    Diabetes mellitus due to underlying condition without complication, without long-term current use of insulin (Multi)    DMII (diabetes mellitus, type 2) (Multi)    Early dry stage nonexudative age-related macular degeneration of both eyes    Heart murmur    Histoplasmosis    Hyperkalemia    Hyperlipidemia    Intention tremor    Low  back pain    Nocturia    Mild vitamin D deficiency    Nonproliferative diabetic retinopathy (Multi)    Perianal fistula    Pleural thickening    Pre-ulcerative corn or callous    Presence of aortocoronary bypass graft    Pure hypercholesterolemia    S/P AVR (aortic valve replacement)    S/P CABG x 1    Stage 3 chronic kidney disease (Multi)    Hypertensive chronic kidney disease w stg 1-4/unsp chr kdny    Tobacco use disorder    Trichomoniasis    Tubular adenoma of colon    Urinary urgency    Weight loss    Acute posthemorrhagic anemia    Athscl heart disease of native coronary artery w/o ang pctrs    Dysphagia, unspecified    Histoplasmosis, unspecified    Solitary pulmonary nodule    Personal history of nicotine dependence    Presence of other heart-valve replacement    Unspecified osteoarthritis, unspecified site    Type 2 diabetes mellitus with diabetic chronic kidney disease    Dvrtclos of intest, part unsp, 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     Social History  She reports that she has quit smoking. Her smoking use included cigarettes. She has been exposed to tobacco smoke. She has never used smokeless tobacco. No history on file for alcohol use and drug use.    Family History  Family History   Problem Relation Name Age of Onset    Other (Malignant neoplasm) Mother      Hypertension Father      Lung disease Father      Other (Malignant neoplasm) Father      Other (family history of colon cancer) Other          Allergies  Iodinated contrast media    Review of Systems  A 12-point review of systems was performed and noted be negative except for that which was mentioned in the history of present illness     Last Recorded Vitals  BP (!) 129/47  "(BP Location: Right arm, Patient Position: Sitting, BP Cuff Size: Large adult)   Pulse 52   Ht 1.6 m (5' 3\")   Wt 74.4 kg (164 lb)   BMI 29.05 kg/m²      Physical Exam:  Constitutional:  No acute distress  Respiration:  Breathing comfortably, no stridor  Cardiovascular:  No clubbing/cyanosis/edema in hands  Eyes:  EOM intact, sclera normal  Neuro:  Alert and oriented times 3, Cranial nerves II-XII grossly intact and symmetric bilaterally. No asterixis  Head and Face:  Symmetric facial features, no masses or lesions, sinuses non-tender to palpation  Neck/Lymph:  No LAD, no thyroid masses, trachea midline, No JVD  Skin:  no visible rash or scratching marks   Psych:  Alert and oriented with appropriate mood and affect      Medications:  Medication Documentation Review Audit       Reviewed by Danisha Kerr RN (Registered Nurse) on 09/16/24 at 1615      Medication Order Taking? Sig Documenting Provider Last Dose Status   acetaminophen (Tylenol) 325 mg tablet [id number] No TAKE 1 TO 2 TABLETS EVERY 6 HOURS AS NEEDED. Anthony Cole MD Not Taking Active   amLODIPine (Norvasc) 10 mg tablet [id number] Yes Take 1 tablet (10 mg) by mouth once daily. As directed Historical MD Douglas Taking Active   amLODIPine (Norvasc) 5 mg tablet [id number] No Take 1 tablet (5 mg) by mouth once daily. Anthony Cole MD Not Taking Active   aspirin 81 mg EC tablet [id number] Yes Take by mouth once daily. Historical MD Douglas Taking Active   atorvastatin (Lipitor) 40 mg tablet [id number] Yes  Historical MD Douglas Taking Active   chlorthalidone (Hygroton) 25 mg tablet [id number] Yes Take by mouth once daily. Historical MD Douglas Taking Active   cholecalciferol (Vitamin D-3) 25 MCG (1000 UT) tablet [id number] Yes Take 1 tablet (25 mcg) by mouth once daily. Anthony Cole MD Taking Active   cyanocobalamin (Vitamin B-12) 500 mcg tablet [id number] Yes Take 1 tablet (500 mcg) by mouth once daily. Historical Provider, MD " Taking Active   diclofenac sodium (Voltaren Arthritis Pain) 1 % gel [id number] Yes Apply 4.5 inches (4 g) topically 4 times a day as needed (PRN back pain). Arjun Brody, APRN-CNP Taking Active     Discontinued 09/16/24 1614   diphenoxylate-atropine (Lomotil) 2.5-0.025 mg tablet [id number] Yes Take 1 tablet by mouth 4 times a day as needed for diarrhea. Historical Provider, MD Taking Active   Discontinued 09/16/24 1612   ferrous sulfate 325 (65 Fe) MG tablet [id number] Yes Take by mouth once daily. Anthony Cole MD Taking Active     Discontinued 09/16/24 1613     Discontinued 09/16/24 1613   inulin (FIBER GUMMIES ORAL) [id number] Yes Take 2 each by mouth early in the morning.. Anthony Cole MD Taking Active     Discontinued 09/16/24 1614   lisinopril 20 mg tablet [id number] Yes  Historical Provider, MD Taking Active   lisinopril 40 mg tablet [id number]  Take 1 tablet (40 mg) by mouth once daily. as directed Historical MD Douglas  Active   loperamide (Imodium A-D) 2 mg tablet [id number] No Take 1 tablet (2 mg) by mouth if needed. Anthony Cole MD Not Taking Active   metoprolol succinate XL (Toprol XL) 50 mg 24 hr tablet [id number] No Take 1 tablet (50 mg) by mouth once daily. Historical MD Douglas Not Taking Active   metoprolol tartrate (Lopressor) 50 mg tablet [id number] Yes Take 1 tablet by mouth 2 times a day. Historical MD Douglas Taking Active   MULTIVIT-MIN-FERROUS FUMARATE ORAL [id number] No Take by mouth. Anthony Cole MD Not Taking Active   multivitamin (Daily Vitamin Formula) tablet [id number] Yes Take 1 tablet by mouth once daily. Anthony Cole MD Taking Active   multivitamin with minerals (Multiple Vitamin-Minerals) tablet [id number] No Take 1 tablet by mouth once daily. Anthony Cole MD Not Taking Active   polyethylene glycol (Glycolax, Miralax) 17 gram/dose powder [id number]  MIX 1 CAPFUL IN 8 OUNCES OF WATER AND DRINK AT BEDTIME AS NEEDED FOR CONSTIPATION.  Historical Provider, MD  Active     Discontinued 09/16/24 1614     Discontinued 09/16/24 1615                    Relevant Results Recent labs reviewed in the EMR.  Lab Results   Component Value Date    HGB 11.6 (L) 11/14/2024    HGB 10.3 (L) 08/05/2024    HGB 10.5 (L) 05/08/2024    MCV 95 11/14/2024    MCV 93 08/05/2024    MCV 91 05/08/2024     11/14/2024     08/05/2024     05/08/2024       Lab Results   Component Value Date    FERRITIN 124 05/08/2024    IRON 62 05/08/2024       Lab Results   Component Value Date     11/14/2024    K 5.1 11/14/2024     11/14/2024    BUN 40 (H) 11/14/2024    CREATININE 1.95 (H) 11/14/2024       Imaging:  I personally reviewed then and this is my impression:        Assessment/Plan   1. Smoldering multiple myeloma    2. Stage 3b chronic kidney disease (Multi) (Primary)  - Stable proteinuric CKD, with DM diet control+wt loss, also with SMM, stable Kappa levels. On RASi,+SGLT2i   - Renal function panel; Future  - CBC; Future  - Albumin-Creatinine Ratio, Urine Random; Future  - Follow Up In Nephrology; Future    3. Primary hypertension  Controlled    RTO 4 months, labs before

## 2025-02-19 ENCOUNTER — LAB (OUTPATIENT)
Dept: LAB | Facility: HOSPITAL | Age: 80
End: 2025-02-19
Payer: MEDICARE

## 2025-02-19 DIAGNOSIS — D47.2 SMOLDERING MULTIPLE MYELOMA: ICD-10-CM

## 2025-02-19 LAB
ALBUMIN SERPL BCP-MCNC: 4.8 G/DL (ref 3.4–5)
ALP SERPL-CCNC: 79 U/L (ref 33–136)
ALT SERPL W P-5'-P-CCNC: 9 U/L (ref 7–45)
ANION GAP SERPL CALC-SCNC: 16 MMOL/L (ref 10–20)
AST SERPL W P-5'-P-CCNC: 18 U/L (ref 9–39)
BASOPHILS # BLD AUTO: 0.09 X10*3/UL (ref 0–0.1)
BASOPHILS NFR BLD AUTO: 1 %
BILIRUB SERPL-MCNC: 0.7 MG/DL (ref 0–1.2)
BUN SERPL-MCNC: 40 MG/DL (ref 6–23)
CALCIUM SERPL-MCNC: 10.7 MG/DL (ref 8.6–10.3)
CHLORIDE SERPL-SCNC: 105 MMOL/L (ref 98–107)
CO2 SERPL-SCNC: 24 MMOL/L (ref 21–32)
CREAT SERPL-MCNC: 1.97 MG/DL (ref 0.5–1.05)
EGFRCR SERPLBLD CKD-EPI 2021: 25 ML/MIN/1.73M*2
EOSINOPHIL # BLD AUTO: 0.28 X10*3/UL (ref 0–0.4)
EOSINOPHIL NFR BLD AUTO: 3.1 %
ERYTHROCYTE [DISTWIDTH] IN BLOOD BY AUTOMATED COUNT: 14 % (ref 11.5–14.5)
GLUCOSE SERPL-MCNC: 114 MG/DL (ref 74–99)
HCT VFR BLD AUTO: 37.1 % (ref 36–46)
HGB BLD-MCNC: 11.8 G/DL (ref 12–16)
IGA SERPL-MCNC: 214 MG/DL (ref 70–400)
IGG SERPL-MCNC: 1120 MG/DL (ref 700–1600)
IGM SERPL-MCNC: 22 MG/DL (ref 40–230)
IMM GRANULOCYTES # BLD AUTO: 0.03 X10*3/UL (ref 0–0.5)
IMM GRANULOCYTES NFR BLD AUTO: 0.3 % (ref 0–0.9)
LYMPHOCYTES # BLD AUTO: 2.47 X10*3/UL (ref 0.8–3)
LYMPHOCYTES NFR BLD AUTO: 26.9 %
MCH RBC QN AUTO: 29.9 PG (ref 26–34)
MCHC RBC AUTO-ENTMCNC: 31.8 G/DL (ref 32–36)
MCV RBC AUTO: 94 FL (ref 80–100)
MONOCYTES # BLD AUTO: 0.78 X10*3/UL (ref 0.05–0.8)
MONOCYTES NFR BLD AUTO: 8.5 %
NEUTROPHILS # BLD AUTO: 5.53 X10*3/UL (ref 1.6–5.5)
NEUTROPHILS NFR BLD AUTO: 60.2 %
NRBC BLD-RTO: 0 /100 WBCS (ref 0–0)
PLATELET # BLD AUTO: 199 X10*3/UL (ref 150–450)
POTASSIUM SERPL-SCNC: 4.9 MMOL/L (ref 3.5–5.3)
PROT SERPL-MCNC: 7.8 G/DL (ref 6.4–8.2)
PROT SERPL-MCNC: 8.1 G/DL (ref 6.4–8.2)
RBC # BLD AUTO: 3.94 X10*6/UL (ref 4–5.2)
SODIUM SERPL-SCNC: 140 MMOL/L (ref 136–145)
WBC # BLD AUTO: 9.2 X10*3/UL (ref 4.4–11.3)

## 2025-02-19 PROCEDURE — 86334 IMMUNOFIX E-PHORESIS SERUM: CPT

## 2025-02-19 PROCEDURE — 84155 ASSAY OF PROTEIN SERUM: CPT | Mod: 59

## 2025-02-19 PROCEDURE — 83521 IG LIGHT CHAINS FREE EACH: CPT

## 2025-02-19 PROCEDURE — 84165 PROTEIN E-PHORESIS SERUM: CPT | Performed by: STUDENT IN AN ORGANIZED HEALTH CARE EDUCATION/TRAINING PROGRAM

## 2025-02-19 PROCEDURE — 36415 COLL VENOUS BLD VENIPUNCTURE: CPT

## 2025-02-19 PROCEDURE — 82784 ASSAY IGA/IGD/IGG/IGM EACH: CPT

## 2025-02-19 PROCEDURE — 80053 COMPREHEN METABOLIC PANEL: CPT

## 2025-02-19 PROCEDURE — 86320 SERUM IMMUNOELECTROPHORESIS: CPT | Performed by: STUDENT IN AN ORGANIZED HEALTH CARE EDUCATION/TRAINING PROGRAM

## 2025-02-19 PROCEDURE — 85025 COMPLETE CBC W/AUTO DIFF WBC: CPT

## 2025-02-20 LAB
KAPPA LC SERPL-MCNC: 133.32 MG/DL (ref 0.33–1.94)
KAPPA LC/LAMBDA SER: 68.02 {RATIO} (ref 0.26–1.65)
LAMBDA LC SERPL-MCNC: 1.96 MG/DL (ref 0.57–2.63)

## 2025-02-21 LAB
ALBUMIN: 4.5 G/DL (ref 3.4–5)
ALPHA 1 GLOBULIN: 0.4 G/DL (ref 0.2–0.6)
ALPHA 2 GLOBULIN: 1.1 G/DL (ref 0.4–1.1)
BETA GLOBULIN: 0.9 G/DL (ref 0.5–1.2)
GAMMA GLOBULIN: 1 G/DL (ref 0.5–1.4)
IMMUNOFIXATION COMMENT: ABNORMAL
M-PROTEIN 1: 0.2 G/DL
PATH REVIEW - SERUM IMMUNOFIXATION: ABNORMAL
PATH REVIEW-SERUM PROTEIN ELECTROPHORESIS: ABNORMAL
PROTEIN ELECTROPHORESIS COMMENT: ABNORMAL

## 2025-02-24 ENCOUNTER — TELEMEDICINE (OUTPATIENT)
Dept: HEMATOLOGY/ONCOLOGY | Facility: CLINIC | Age: 80
End: 2025-02-24
Payer: MEDICARE

## 2025-02-24 DIAGNOSIS — D47.2 SMOLDERING MULTIPLE MYELOMA: Primary | ICD-10-CM

## 2025-02-24 DIAGNOSIS — D64.9 ANEMIA, UNSPECIFIED TYPE: ICD-10-CM

## 2025-02-24 DIAGNOSIS — N18.9 CHRONIC KIDNEY DISEASE, UNSPECIFIED CKD STAGE: ICD-10-CM

## 2025-02-24 ASSESSMENT — ENCOUNTER SYMPTOMS
HEMATOLOGIC/LYMPHATIC NEGATIVE: 1
EYES NEGATIVE: 1
RESPIRATORY NEGATIVE: 1
CARDIOVASCULAR NEGATIVE: 1
NUMBNESS: 1
PSYCHIATRIC NEGATIVE: 1
ALLERGIC/IMMUNOLOGIC NEGATIVE: 1
ENDOCRINE NEGATIVE: 1
CONSTITUTIONAL NEGATIVE: 1
MUSCULOSKELETAL NEGATIVE: 1
GASTROINTESTINAL NEGATIVE: 1

## 2025-02-24 NOTE — PROGRESS NOTES
Patient ID: Felipa Hood is a 79 y.o. female.  Referring Physician: No referring provider defined for this encounter.  Primary Care Provider: DB Lobo  Date of Service:  2/24/2025    Virtual or Telephone Consent    A telephone visit (audio only) between the patient (at the originating site) and the provider (at the distant site) was utilized to provide this telehealth service.   Verbal consent was requested and obtained from Felipa Hood on this date, 02/24/25 for a telehealth visit.     Diagnosis: Kappa Light Chain Smoldering Myeloma     Has a past medical history of aortic stenosis, HTN, CKD 3, DM2, and HLD. Presents as a referral from her nephrologist Dr. Garcia for concern for plasma cell dyscrasia.      Workup:  SPEP (6/29/23): Discrete Kappa FLC not definitively monoclonal  SFLC (6/29/23): Kappa .15mg/dL, FLC ratio 50.73  Spot UPEP (6/29/23): Total protein 258, 1.6% kappa FLC   Osseous survey (8/2/23): negative for lytic lesions however showed osseous demineralization   24 hour urine (8/4/23): Total protein 538, UPEP pending   BMBx (9/7/23):  -- NORMOCELLULAR BONE MARROW (20-30%) WITH MATURING TRILINEAGE HEMATOPOIESIS  INVOLVEMENT BY KAPPA+ PLASMA CELL NEOPLASM (15-20% BY IMMUNOSTAINS), SEE NOTE.     NOTE: The findings are consistent with plasma cell neoplasm. In absence of myeloma defining events, the findings could be consistent with smoldering myeloma. Clinical, radiological and laboratory correlation is suggested.     FISH:   This analysis showed an abnormal result with gain of chromosome 1q and loss of IGH.  The assay did not find loss of 1p or rearrangement of IGH.  Please note, due to the quality of cells there was poor hybridization of probes for TP53 and CEP 3, 7, and 11. Therefore analysis for loss of TP53 and hyperdiploidy of chromosomes 3, 7, and 11 could not be performed.     PET/CT (10/4/23):   1. No evidence of a hypermetabolic osteolytic lesion throughout  the  axial and appendicular skeleton to suggest a focal myelomatous  process.  2. Diffuse FDG uptake along the superior endplate of L1 vertebral  body compatible with recent compression fracture.         Medical History:  Aortic stenosis  HTN  CKD stage 3  DM 2  Cataracts  HLD  Pulmonary nodules  Histoplasmosis  Lower back pain     Surgical History:  Aortic valve replacement  CABG  Lung wedge resection      Social History:  Retired  Former smoker     Family History:   Mother- malignant neoplasm  Father- HTN, lung disease, malignant neoplasm      SUBJECTIVE:  History of Present Illness:  Felipa presents to clinic 2/24/25 for a follow up phone call.    Had her daughter and grandson in town this past weekend which was very nice.    Otherwise she is feeling well without acute complaints today.     Has been having some numbness and tingling in her feet. She has a scan tomorrow.         Review of Systems   Constitutional: Negative.    HENT: Negative.     Eyes: Negative.    Respiratory: Negative.     Cardiovascular: Negative.    Gastrointestinal: Negative.    Endocrine: Negative.    Genitourinary: Negative.    Musculoskeletal: Negative.    Skin: Negative.    Allergic/Immunologic: Negative.    Neurological:  Positive for numbness.   Hematological: Negative.    Psychiatric/Behavioral: Negative.       OBJECTIVE:  KPS: Karnofsky Score: 90 - Able to carry on normal activity; minor signs or symptoms of disease    VS:  There were no vitals taken for this visit.  BSA: There is no height or weight on file to calculate BSA.    Laboratory:  The pertinent laboratory results were reviewed and discussed with the patient.    Lab Results   Component Value Date    WBC 9.2 02/19/2025    HCT 37.1 02/19/2025    HGB 11.8 (L) 02/19/2025     02/19/2025    K 4.9 02/19/2025    CALCIUM 10.7 (H) 02/19/2025     02/19/2025    MG 1.80 11/11/2022    ALT 9 02/19/2025    AST 18 02/19/2025    BUN 40 (H) 02/19/2025    CREATININE 1.97 (H)  02/19/2025    PHOS 4.3 11/14/2024    KAPPA 133.32 (H) 02/19/2025    LAMBDA 1.96 02/19/2025    KAPLS 68.02 (H) 02/19/2025    SPEP Aberrant band detected. See immunofixation. 02/19/2025    IEPIN  02/19/2025 2/19/25 Known monoclonal free kappa light chains in the gamma region at 0.2 g/dL. Last detected on 11/14/24 at 0.1 g/dL.    IGG 1,120 02/19/2025    IGM 22 (L) 02/19/2025     02/19/2025      Note: for a comprehensive list of the patient's lab results, access the Results Review activity.    ASSESSMENT and PLAN:     Smoldering Multiple Myeloma:  - Discrete Kappa FLC not definitively monoclonal noted on SPEP  - Spot UPEP showed 1.6% Kappa FLC M protein  - Kappa .15mg/dL, FLC ratio 50.73  - 24 hour UPEP pending  - Osseous survey negative for lytic lesions   - BMBx showed 15-20% plasma cells consistent with smoldering myeloma  - FISH showed gain of 1q and rearrangement of IGH  - PET/CT negative outside of L1 compression fracture  - Labs stable      Renal:  - CKD stage 4  - Recent sCr >3, hence workup for plasma cell dyscrasia  - sCr stable   - Follows w/ Dr. Huang     Cardiology:  - Follows with Dr. Ring  - History of HTN, Aortic stenosis  - S/p AVR, CABG  - S/p valve replacement     Anemia:  - Hgb 11.8  - Taking iron supplement daily    RTC:  3 months virtual w/ labs prior     I spent 42 minutes reviewing, planning, and counseling with the pt.    Arjun Brody, APRN-CNP

## 2025-02-25 ENCOUNTER — HOSPITAL ENCOUNTER (OUTPATIENT)
Dept: VASCULAR MEDICINE | Facility: HOSPITAL | Age: 80
Discharge: HOME | End: 2025-02-25
Payer: MEDICARE

## 2025-02-25 DIAGNOSIS — E11.51 TYPE 2 DIABETES MELLITUS WITH DIABETIC PERIPHERAL ANGIOPATHY WITHOUT GANGRENE (MULTI): ICD-10-CM

## 2025-02-25 DIAGNOSIS — I73.9 PERIPHERAL VASCULAR DISEASE, UNSPECIFIED (CMS-HCC): ICD-10-CM

## 2025-02-25 PROCEDURE — 93923 UPR/LXTR ART STDY 3+ LVLS: CPT

## 2025-02-25 PROCEDURE — 93923 UPR/LXTR ART STDY 3+ LVLS: CPT | Performed by: SURGERY

## 2025-02-25 PROCEDURE — 93925 LOWER EXTREMITY STUDY: CPT

## 2025-05-12 ENCOUNTER — TELEPHONE (OUTPATIENT)
Dept: HEMATOLOGY/ONCOLOGY | Facility: CLINIC | Age: 80
End: 2025-05-12
Payer: MEDICARE

## 2025-05-12 NOTE — TELEPHONE ENCOUNTER
Called patient to remind her to have labs drawn prior to virtual visit on 5/19.    No voicemail set up on phone..  Was not able to leave message    Sent a my chart message to patient.

## 2025-05-15 ENCOUNTER — LAB (OUTPATIENT)
Dept: LAB | Facility: HOSPITAL | Age: 80
End: 2025-05-15
Payer: MEDICARE

## 2025-05-15 DIAGNOSIS — D47.2 SMOLDERING MULTIPLE MYELOMA: ICD-10-CM

## 2025-05-15 DIAGNOSIS — N18.32 STAGE 3B CHRONIC KIDNEY DISEASE (MULTI): ICD-10-CM

## 2025-05-15 LAB
ALBUMIN SERPL BCP-MCNC: 4.5 G/DL (ref 3.4–5)
ALP SERPL-CCNC: 78 U/L (ref 33–136)
ALT SERPL W P-5'-P-CCNC: 11 U/L (ref 7–45)
ANION GAP SERPL CALC-SCNC: 15 MMOL/L (ref 10–20)
AST SERPL W P-5'-P-CCNC: 19 U/L (ref 9–39)
BASOPHILS # BLD AUTO: 0.11 X10*3/UL (ref 0–0.1)
BASOPHILS NFR BLD AUTO: 1.4 %
BILIRUB SERPL-MCNC: 0.5 MG/DL (ref 0–1.2)
BUN SERPL-MCNC: 40 MG/DL (ref 6–23)
CALCIUM SERPL-MCNC: 10.3 MG/DL (ref 8.6–10.3)
CHLORIDE SERPL-SCNC: 107 MMOL/L (ref 98–107)
CO2 SERPL-SCNC: 23 MMOL/L (ref 21–32)
CREAT SERPL-MCNC: 2.23 MG/DL (ref 0.5–1.05)
CREAT UR-MCNC: 95.8 MG/DL (ref 20–320)
EGFRCR SERPLBLD CKD-EPI 2021: 22 ML/MIN/1.73M*2
EOSINOPHIL # BLD AUTO: 0.39 X10*3/UL (ref 0–0.4)
EOSINOPHIL NFR BLD AUTO: 5.1 %
ERYTHROCYTE [DISTWIDTH] IN BLOOD BY AUTOMATED COUNT: 14.5 % (ref 11.5–14.5)
GLUCOSE SERPL-MCNC: 122 MG/DL (ref 74–99)
HCT VFR BLD AUTO: 36.2 % (ref 36–46)
HGB BLD-MCNC: 11.3 G/DL (ref 12–16)
IGA SERPL-MCNC: 188 MG/DL (ref 70–400)
IGG SERPL-MCNC: 962 MG/DL (ref 700–1600)
IGM SERPL-MCNC: 17 MG/DL (ref 40–230)
IMM GRANULOCYTES # BLD AUTO: 0.05 X10*3/UL (ref 0–0.5)
IMM GRANULOCYTES NFR BLD AUTO: 0.6 % (ref 0–0.9)
LYMPHOCYTES # BLD AUTO: 2.36 X10*3/UL (ref 0.8–3)
LYMPHOCYTES NFR BLD AUTO: 30.6 %
MCH RBC QN AUTO: 29.7 PG (ref 26–34)
MCHC RBC AUTO-ENTMCNC: 31.2 G/DL (ref 32–36)
MCV RBC AUTO: 95 FL (ref 80–100)
MICROALBUMIN UR-MCNC: 433.3 MG/L
MICROALBUMIN/CREAT UR: 452.3 UG/MG CREAT
MONOCYTES # BLD AUTO: 0.79 X10*3/UL (ref 0.05–0.8)
MONOCYTES NFR BLD AUTO: 10.2 %
NEUTROPHILS # BLD AUTO: 4.02 X10*3/UL (ref 1.6–5.5)
NEUTROPHILS NFR BLD AUTO: 52.1 %
NRBC BLD-RTO: 0 /100 WBCS (ref 0–0)
PLATELET # BLD AUTO: 223 X10*3/UL (ref 150–450)
POTASSIUM SERPL-SCNC: 4.9 MMOL/L (ref 3.5–5.3)
PROT SERPL-MCNC: 7.4 G/DL (ref 6.4–8.2)
PROT SERPL-MCNC: 7.6 G/DL (ref 6.4–8.2)
RBC # BLD AUTO: 3.81 X10*6/UL (ref 4–5.2)
SODIUM SERPL-SCNC: 140 MMOL/L (ref 136–145)
WBC # BLD AUTO: 7.7 X10*3/UL (ref 4.4–11.3)

## 2025-05-15 PROCEDURE — 82570 ASSAY OF URINE CREATININE: CPT

## 2025-05-15 PROCEDURE — 84155 ASSAY OF PROTEIN SERUM: CPT

## 2025-05-15 PROCEDURE — 82784 ASSAY IGA/IGD/IGG/IGM EACH: CPT

## 2025-05-15 PROCEDURE — 84165 PROTEIN E-PHORESIS SERUM: CPT | Performed by: STUDENT IN AN ORGANIZED HEALTH CARE EDUCATION/TRAINING PROGRAM

## 2025-05-15 PROCEDURE — 80053 COMPREHEN METABOLIC PANEL: CPT

## 2025-05-15 PROCEDURE — 85025 COMPLETE CBC W/AUTO DIFF WBC: CPT

## 2025-05-15 PROCEDURE — 83521 IG LIGHT CHAINS FREE EACH: CPT

## 2025-05-15 PROCEDURE — 36415 COLL VENOUS BLD VENIPUNCTURE: CPT

## 2025-05-15 PROCEDURE — 86320 SERUM IMMUNOELECTROPHORESIS: CPT | Performed by: STUDENT IN AN ORGANIZED HEALTH CARE EDUCATION/TRAINING PROGRAM

## 2025-05-15 PROCEDURE — 86334 IMMUNOFIX E-PHORESIS SERUM: CPT

## 2025-05-16 LAB
KAPPA LC SERPL-MCNC: 130.33 MG/DL (ref 0.33–1.94)
KAPPA LC/LAMBDA SER: 59.51 {RATIO} (ref 0.26–1.65)
LAMBDA LC SERPL-MCNC: 2.19 MG/DL (ref 0.57–2.63)

## 2025-05-19 ENCOUNTER — APPOINTMENT (OUTPATIENT)
Dept: HEMATOLOGY/ONCOLOGY | Facility: CLINIC | Age: 80
End: 2025-05-19
Payer: MEDICARE

## 2025-05-20 LAB
ALBUMIN: 4.3 G/DL (ref 3.4–5)
ALPHA 1 GLOBULIN: 0.4 G/DL (ref 0.2–0.6)
ALPHA 2 GLOBULIN: 1 G/DL (ref 0.4–1.1)
BETA GLOBULIN: 0.8 G/DL (ref 0.5–1.2)
GAMMA GLOBULIN: 0.9 G/DL (ref 0.5–1.4)
IMMUNOFIXATION COMMENT: ABNORMAL
M-PROTEIN 1: 0.1 G/DL (ref ?–0)
PATH REVIEW - SERUM IMMUNOFIXATION: ABNORMAL
PATH REVIEW-SERUM PROTEIN ELECTROPHORESIS: ABNORMAL
PROTEIN ELECTROPHORESIS COMMENT: ABNORMAL

## 2025-05-27 ENCOUNTER — TELEMEDICINE (OUTPATIENT)
Dept: HEMATOLOGY/ONCOLOGY | Facility: CLINIC | Age: 80
End: 2025-05-27
Payer: MEDICARE

## 2025-05-27 ENCOUNTER — TELEPHONE (OUTPATIENT)
Dept: HEMATOLOGY/ONCOLOGY | Facility: HOSPITAL | Age: 80
End: 2025-05-27
Payer: MEDICARE

## 2025-05-27 DIAGNOSIS — S32.010S COMPRESSION FRACTURE OF L1 LUMBAR VERTEBRA, SEQUELA: ICD-10-CM

## 2025-05-27 DIAGNOSIS — D47.2 SMOLDERING MULTIPLE MYELOMA: Primary | ICD-10-CM

## 2025-05-27 DIAGNOSIS — D64.9 ANEMIA, UNSPECIFIED TYPE: ICD-10-CM

## 2025-05-27 DIAGNOSIS — N18.9 CHRONIC KIDNEY DISEASE, UNSPECIFIED CKD STAGE: ICD-10-CM

## 2025-05-27 PROCEDURE — 99215 OFFICE O/P EST HI 40 MIN: CPT

## 2025-05-27 ASSESSMENT — ENCOUNTER SYMPTOMS
PSYCHIATRIC NEGATIVE: 1
RESPIRATORY NEGATIVE: 1
EYES NEGATIVE: 1
CONSTITUTIONAL NEGATIVE: 1
NUMBNESS: 1
HEMATOLOGIC/LYMPHATIC NEGATIVE: 1
CARDIOVASCULAR NEGATIVE: 1
GASTROINTESTINAL NEGATIVE: 1
ALLERGIC/IMMUNOLOGIC NEGATIVE: 1
MUSCULOSKELETAL NEGATIVE: 1
ENDOCRINE NEGATIVE: 1

## 2025-05-27 NOTE — TELEPHONE ENCOUNTER
Pt called to inquire about time of virtual visit today with JENNIFER Brody. Advised that he is running behind and will call around 11AM for virtual visit. Pt appreciative and agreeable. No additional needs.

## 2025-05-27 NOTE — PROGRESS NOTES
Patient ID: Felipa Hood is a 80 y.o. female.  Referring Physician: No referring provider defined for this encounter.  Primary Care Provider: DB Lobo  Date of Service:  5/27/2025    Virtual or Telephone Consent    While technically available, the patient was unable to connect via audio/video telehealth technology; therefore, I performed this visit using a real-time audio only connection between Felipa Hood & DB Hoyt.  Verbal consent was requested and obtained from Felipa Hood on this date, 05/27/25 for a telehealth visit and the patient's location was confirmed at the time of the visit.     Diagnosis: Kappa Light Chain Smoldering Myeloma     Has a past medical history of aortic stenosis, HTN, CKD 3, DM2, and HLD. Presents as a referral from her nephrologist Dr. Garcia for concern for plasma cell dyscrasia.      Workup:  SPEP (6/29/23): Discrete Kappa FLC not definitively monoclonal  SFLC (6/29/23): Kappa .15mg/dL, FLC ratio 50.73  Spot UPEP (6/29/23): Total protein 258, 1.6% kappa FLC   Osseous survey (8/2/23): negative for lytic lesions however showed osseous demineralization   24 hour urine (8/4/23): Total protein 538, UPEP pending   BMBx (9/7/23):  -- NORMOCELLULAR BONE MARROW (20-30%) WITH MATURING TRILINEAGE HEMATOPOIESIS  INVOLVEMENT BY KAPPA+ PLASMA CELL NEOPLASM (15-20% BY IMMUNOSTAINS), SEE NOTE.     NOTE: The findings are consistent with plasma cell neoplasm. In absence of myeloma defining events, the findings could be consistent with smoldering myeloma. Clinical, radiological and laboratory correlation is suggested.     FISH:   This analysis showed an abnormal result with gain of chromosome 1q and loss of IGH.  The assay did not find loss of 1p or rearrangement of IGH.  Please note, due to the quality of cells there was poor hybridization of probes for TP53 and CEP 3, 7, and 11. Therefore analysis for loss of TP53 and hyperdiploidy of chromosomes 3,  7, and 11 could not be performed.     PET/CT (10/4/23):   1. No evidence of a hypermetabolic osteolytic lesion throughout the  axial and appendicular skeleton to suggest a focal myelomatous  process.  2. Diffuse FDG uptake along the superior endplate of L1 vertebral  body compatible with recent compression fracture.         Medical History:  Aortic stenosis  HTN  CKD stage 3  DM 2  Cataracts  HLD  Pulmonary nodules  Histoplasmosis  Lower back pain     Surgical History:  Aortic valve replacement  CABG  Lung wedge resection      Social History:  Retired  Former smoker     Family History:   Mother- malignant neoplasm  Father- HTN, lung disease, malignant neoplasm      SUBJECTIVE:  History of Present Illness:  Felipa presents to clinic 25 for a follow up phone call.    Overall she is doing okay.    Her sister passed away and is working on her  arrangements.     No new issues or complaints at this time, working on her hydration.           Review of Systems   Constitutional: Negative.    HENT: Negative.     Eyes: Negative.    Respiratory: Negative.     Cardiovascular: Negative.    Gastrointestinal: Negative.    Endocrine: Negative.    Genitourinary: Negative.    Musculoskeletal: Negative.    Skin: Negative.    Allergic/Immunologic: Negative.    Neurological:  Positive for numbness.   Hematological: Negative.    Psychiatric/Behavioral: Negative.       OBJECTIVE:  KPS: Karnofsky Score: 90 - Able to carry on normal activity; minor signs or symptoms of disease    VS:  There were no vitals taken for this visit.  BSA: There is no height or weight on file to calculate BSA.    Laboratory:  The pertinent laboratory results were reviewed and discussed with the patient.    Lab Results   Component Value Date    WBC 7.7 05/15/2025    HCT 36.2 05/15/2025    HGB 11.3 (L) 05/15/2025     05/15/2025    K 4.9 05/15/2025    CALCIUM 10.3 05/15/2025     05/15/2025    MG 1.80 2022    ALT 11 05/15/2025    AST 19  05/15/2025    BUN 40 (H) 05/15/2025    CREATININE 2.23 (H) 05/15/2025    PHOS 4.3 11/14/2024    KAPPA 130.33 (H) 05/15/2025    LAMBDA 2.19 05/15/2025    KAPLS 59.51 (H) 05/15/2025    SPEP Aberrant band detected. See immunofixation. 05/15/2025    IEPIN  05/15/2025     5/15/25  Known monoclonal free kappa light chains in the gamma region at 0.1 g/dL.     Last detected on 2/19/25 at 0.2 g/dL.          05/15/2025    IGM 17 (L) 05/15/2025     05/15/2025      Note: for a comprehensive list of the patient's lab results, access the Results Review activity.    ASSESSMENT and PLAN:     Smoldering Multiple Myeloma:  - Discrete Kappa FLC not definitively monoclonal noted on SPEP  - Spot UPEP showed 1.6% Kappa FLC M protein  - Kappa .15mg/dL, FLC ratio 50.73  - 24 hour UPEP pending  - Osseous survey negative for lytic lesions   - BMBx showed 15-20% plasma cells consistent with smoldering myeloma  - FISH showed gain of 1q and rearrangement of IGH  - PET/CT negative outside of L1 compression fracture  - Labs stable      Renal:  - CKD stage 4  - Recent sCr >3, hence workup for plasma cell dyscrasia  - sCr stable   - Follows w/ Dr. Huang  - Albuminuria improving     Cardiology:  - Follows with Dr. Ring  - History of HTN, Aortic stenosis  - S/p AVR, CABG  - S/p valve replacement     Anemia:  - Hgb 11.3  - Taking iron supplement daily    RTC:  3 months virtual w/ labs prior     I spent 19 minutes discussing the results and POC with the pt  I spent 30 minutes reviewing the chart, documenting, entering orders, and formulating the plan of care.     Arjun Brody, JARED-CNP

## 2025-06-16 ENCOUNTER — APPOINTMENT (OUTPATIENT)
Dept: NEPHROLOGY | Facility: CLINIC | Age: 80
End: 2025-06-16
Payer: MEDICARE

## 2025-06-16 VITALS
WEIGHT: 166 LBS | DIASTOLIC BLOOD PRESSURE: 62 MMHG | SYSTOLIC BLOOD PRESSURE: 144 MMHG | HEART RATE: 57 BPM | BODY MASS INDEX: 29.41 KG/M2

## 2025-06-16 DIAGNOSIS — N18.32 STAGE 3B CHRONIC KIDNEY DISEASE (MULTI): Primary | ICD-10-CM

## 2025-06-16 DIAGNOSIS — Z51.89 THERAPEUTIC PROCEDURE: ICD-10-CM

## 2025-06-16 PROCEDURE — 99214 OFFICE O/P EST MOD 30 MIN: CPT | Performed by: INTERNAL MEDICINE

## 2025-06-16 PROCEDURE — 1036F TOBACCO NON-USER: CPT | Performed by: INTERNAL MEDICINE

## 2025-06-16 PROCEDURE — 3078F DIAST BP <80 MM HG: CPT | Performed by: INTERNAL MEDICINE

## 2025-06-16 PROCEDURE — 1159F MED LIST DOCD IN RCRD: CPT | Performed by: INTERNAL MEDICINE

## 2025-06-16 PROCEDURE — 3075F SYST BP GE 130 - 139MM HG: CPT | Performed by: INTERNAL MEDICINE

## 2025-06-16 RX ORDER — IPRATROPIUM BROMIDE 21 UG/1
SPRAY, METERED NASAL
COMMUNITY
Start: 2025-04-21

## 2025-06-16 NOTE — PROGRESS NOTES
Subjective   Patient ID: Felipa Hood is a 80 y.o. female with PMH of CKD, pre-DM, HTN, COPD, SMM who presents for Follow-up.    HPI  a 79 y.o. female with a history of CKD, pre-DM, HTN, COPD, SMM. seen before by Dr Garcia. Presenting for follow up of CKD.    Is being followed by [facility], no interventions regarding SMM. KFTs possibly gradually worsening. Stable Urine protein & serum FLC.    Review of Systems  No urinary changes. No dysuria  No CP. No SOB  Good sleep and appetite  Good ambulation    Medications Ordered Prior to Encounter[1]     Objective   Vitals: /62 (BP Location: Right arm, Patient Position: Sitting)   Pulse 57   Wt 75.3 kg (166 lb)   BMI 29.41 kg/m²      Physical Exam  Comfortable in chair  No distress  No LE Edema noted  No JVD      Assessment/Plan   a 79 y.o. female with a history of CKD, pre-DM, HTN, COPD, SMM. seen before by Dr Garcia. Presenting for follow up of CKD.    #Smoldering MM  - Diagnosed with SMM, under close Follow up by [facility]  - Kappa light chain smoldering MM  - Stable Kappa FLC levels    #CKD  - Previous baseline 1.6-1.9. Most Recent Cr 2.2 (eGFR 22)  - Already on Lisinopril, Jardiance  - Albuminuria improved to 450 (was 1118)  - Considering slight worsening of KFTs, as well as no explanation for the proteinuria is evident, will consider Kidney Biopsy if eGFR or Proteinuria worsen. Suspecting possible MM involvement.    #HTN  - Retired nurse  - On Chlorthalidone, Lisinopril, Metoprolol, Amlodipine  - SBP at home ~140-150    #Borderline High Ca    ** Plan:  - F/U in 2 months  - Close follow up of KFTs  - CBC, RFP, PTH, Vit D, Urine Pr/Cr, iCal before F/U  - May possibly need Kidney Biopsy, Pt & PTT ordered      Problem List Items Addressed This Visit          Genitourinary and Reproductive    CKD (chronic kidney disease) - Primary    Relevant Orders    CBC and Auto Differential    Renal function panel    Urine Protein Electrophoresis + Immunofixation    Protein,  urine, random    Albumin-Creatinine Ratio, Urine Random    PTH, intact    Vitamin D 25-Hydroxy,Total (for eval of Vitamin D levels)    Calcium, ionized    Follow Up In Nephrology     Other Visit Diagnoses         Therapeutic procedure        Relevant Orders    Protime-INR    APTT          RTC in 2 Mo, or earlier as needed.    Reviewed and approved by JASON PATEL on 6/16/25 at 4:04 PM.        [1]   Current Outpatient Medications on File Prior to Visit   Medication Sig Dispense Refill    acetaminophen (Tylenol) 325 mg tablet TAKE 1 TO 2 TABLETS EVERY 6 HOURS AS NEEDED.      amLODIPine (Norvasc) 10 mg tablet Take 1 tablet (10 mg) by mouth once daily. As directed      aspirin 81 mg EC tablet Take by mouth once daily.      atorvastatin (Lipitor) 40 mg tablet       chlorthalidone (Hygroton) 25 mg tablet Take by mouth once daily.      cholecalciferol (Vitamin D-3) 25 MCG (1000 UT) tablet Take 1 tablet (25 mcg) by mouth once daily.      cyanocobalamin (Vitamin B-12) 500 mcg tablet Take 1 tablet (500 mcg) by mouth once daily.      diclofenac sodium (Voltaren Arthritis Pain) 1 % gel Apply 4.5 inches (4 g) topically 4 times a day as needed (PRN back pain). 450 g 3    empagliflozin (Jardiance) 10 mg Take 1 tablet (10 mg) by mouth once daily. 30 tablet 11    ferrous sulfate 325 (65 Fe) MG tablet Take by mouth once daily.      inulin (FIBER GUMMIES ORAL) Take 2 each by mouth early in the morning..      ipratropium (Atrovent) 21 mcg (0.03 %) nasal spray 1-2 sprays each nostril every 8 hours as needed congestion      lisinopril 40 mg tablet Take 1 tablet (40 mg) by mouth once daily. as directed      metoprolol tartrate (Lopressor) 50 mg tablet Take 1 tablet by mouth 2 times a day.      multivitamin (Daily Vitamin Formula) tablet Take 1 tablet by mouth once daily.       No current facility-administered medications on file prior to visit.

## 2025-08-18 ENCOUNTER — LAB (OUTPATIENT)
Dept: LAB | Facility: HOSPITAL | Age: 80
End: 2025-08-18
Payer: MEDICARE

## 2025-08-18 ENCOUNTER — APPOINTMENT (OUTPATIENT)
Dept: NEPHROLOGY | Facility: CLINIC | Age: 80
End: 2025-08-18
Payer: MEDICARE

## 2025-08-18 VITALS
HEART RATE: 54 BPM | BODY MASS INDEX: 28.7 KG/M2 | WEIGHT: 162 LBS | SYSTOLIC BLOOD PRESSURE: 141 MMHG | DIASTOLIC BLOOD PRESSURE: 47 MMHG

## 2025-08-18 DIAGNOSIS — I10 ESSENTIAL HYPERTENSION: ICD-10-CM

## 2025-08-18 DIAGNOSIS — N18.32 STAGE 3B CHRONIC KIDNEY DISEASE (MULTI): Primary | ICD-10-CM

## 2025-08-18 DIAGNOSIS — D50.8 OTHER IRON DEFICIENCY ANEMIA: ICD-10-CM

## 2025-08-18 DIAGNOSIS — D47.2 SMOLDERING MULTIPLE MYELOMA: ICD-10-CM

## 2025-08-18 LAB
ALBUMIN SERPL BCP-MCNC: 4.7 G/DL (ref 3.4–5)
ALP SERPL-CCNC: 75 U/L (ref 33–136)
ALT SERPL W P-5'-P-CCNC: 8 U/L (ref 7–45)
ANION GAP SERPL CALC-SCNC: 13 MMOL/L (ref 10–20)
APPEARANCE UR: CLEAR
AST SERPL W P-5'-P-CCNC: 17 U/L (ref 9–39)
BASOPHILS # BLD AUTO: 0.12 X10*3/UL (ref 0–0.1)
BASOPHILS NFR BLD AUTO: 1.4 %
BILIRUB SERPL-MCNC: 0.6 MG/DL (ref 0–1.2)
BILIRUB UR STRIP.AUTO-MCNC: NEGATIVE MG/DL
BUN SERPL-MCNC: 36 MG/DL (ref 6–23)
CALCIUM SERPL-MCNC: 10.4 MG/DL (ref 8.6–10.3)
CHLORIDE SERPL-SCNC: 108 MMOL/L (ref 98–107)
CO2 SERPL-SCNC: 23 MMOL/L (ref 21–32)
COLOR UR: ABNORMAL
CREAT SERPL-MCNC: 2.21 MG/DL (ref 0.5–1.05)
CREAT UR-MCNC: 100.2 MG/DL (ref 20–320)
CREAT UR-MCNC: 97.3 MG/DL (ref 20–320)
EGFRCR SERPLBLD CKD-EPI 2021: 22 ML/MIN/1.73M*2
EOSINOPHIL # BLD AUTO: 0.43 X10*3/UL (ref 0–0.4)
EOSINOPHIL NFR BLD AUTO: 5 %
ERYTHROCYTE [DISTWIDTH] IN BLOOD BY AUTOMATED COUNT: 14.2 % (ref 11.5–14.5)
GLUCOSE SERPL-MCNC: 122 MG/DL (ref 74–99)
GLUCOSE UR STRIP.AUTO-MCNC: ABNORMAL MG/DL
HCT VFR BLD AUTO: 36.4 % (ref 36–46)
HGB BLD-MCNC: 11.8 G/DL (ref 12–16)
IGA SERPL-MCNC: 166 MG/DL (ref 70–400)
IGG SERPL-MCNC: 936 MG/DL (ref 700–1600)
IGM SERPL-MCNC: 17 MG/DL (ref 40–230)
IMM GRANULOCYTES # BLD AUTO: 0.04 X10*3/UL (ref 0–0.5)
IMM GRANULOCYTES NFR BLD AUTO: 0.5 % (ref 0–0.9)
KETONES UR STRIP.AUTO-MCNC: NEGATIVE MG/DL
LEUKOCYTE ESTERASE UR QL STRIP.AUTO: NEGATIVE
LYMPHOCYTES # BLD AUTO: 2.14 X10*3/UL (ref 0.8–3)
LYMPHOCYTES NFR BLD AUTO: 25.1 %
MCH RBC QN AUTO: 30.6 PG (ref 26–34)
MCHC RBC AUTO-ENTMCNC: 32.4 G/DL (ref 32–36)
MCV RBC AUTO: 94 FL (ref 80–100)
MICROALBUMIN UR-MCNC: 212.1 MG/L
MICROALBUMIN/CREAT UR: 218 UG/MG CREAT
MONOCYTES # BLD AUTO: 0.69 X10*3/UL (ref 0.05–0.8)
MONOCYTES NFR BLD AUTO: 8.1 %
NEUTROPHILS # BLD AUTO: 5.1 X10*3/UL (ref 1.6–5.5)
NEUTROPHILS NFR BLD AUTO: 59.9 %
NITRITE UR QL STRIP.AUTO: NEGATIVE
NRBC BLD-RTO: 0 /100 WBCS (ref 0–0)
PH UR STRIP.AUTO: 5.5 [PH]
PHOSPHATE SERPL-MCNC: 4.2 MG/DL (ref 2.5–4.9)
PLATELET # BLD AUTO: 209 X10*3/UL (ref 150–450)
POTASSIUM SERPL-SCNC: 4.4 MMOL/L (ref 3.5–5.3)
PROT SERPL-MCNC: 7.3 G/DL (ref 6.4–8.2)
PROT SERPL-MCNC: 8 G/DL (ref 6.4–8.2)
PROT UR STRIP.AUTO-MCNC: ABNORMAL MG/DL
PROT UR-ACNC: 50 MG/DL (ref 5–24)
PROT/CREAT UR: 0.5 MG/MG CREAT (ref 0–0.17)
RBC # BLD AUTO: 3.86 X10*6/UL (ref 4–5.2)
RBC # UR STRIP.AUTO: NEGATIVE MG/DL
RBC #/AREA URNS AUTO: NORMAL /HPF
SODIUM SERPL-SCNC: 140 MMOL/L (ref 136–145)
SP GR UR STRIP.AUTO: 1.01
SQUAMOUS #/AREA URNS AUTO: NORMAL /HPF
UROBILINOGEN UR STRIP.AUTO-MCNC: NORMAL MG/DL
WBC # BLD AUTO: 8.5 X10*3/UL (ref 4.4–11.3)
WBC #/AREA URNS AUTO: NORMAL /HPF

## 2025-08-18 PROCEDURE — 83970 ASSAY OF PARATHORMONE: CPT | Performed by: INTERNAL MEDICINE

## 2025-08-18 PROCEDURE — 83521 IG LIGHT CHAINS FREE EACH: CPT

## 2025-08-18 PROCEDURE — 85025 COMPLETE CBC W/AUTO DIFF WBC: CPT

## 2025-08-18 PROCEDURE — 81001 URINALYSIS AUTO W/SCOPE: CPT | Performed by: INTERNAL MEDICINE

## 2025-08-18 PROCEDURE — 36415 COLL VENOUS BLD VENIPUNCTURE: CPT

## 2025-08-18 PROCEDURE — 99214 OFFICE O/P EST MOD 30 MIN: CPT | Performed by: INTERNAL MEDICINE

## 2025-08-18 PROCEDURE — 82570 ASSAY OF URINE CREATININE: CPT | Performed by: INTERNAL MEDICINE

## 2025-08-18 PROCEDURE — 3078F DIAST BP <80 MM HG: CPT | Performed by: INTERNAL MEDICINE

## 2025-08-18 PROCEDURE — 82784 ASSAY IGA/IGD/IGG/IGM EACH: CPT

## 2025-08-18 PROCEDURE — 84155 ASSAY OF PROTEIN SERUM: CPT | Mod: 59

## 2025-08-18 PROCEDURE — 86334 IMMUNOFIX E-PHORESIS SERUM: CPT

## 2025-08-18 PROCEDURE — 84100 ASSAY OF PHOSPHORUS: CPT | Performed by: INTERNAL MEDICINE

## 2025-08-18 PROCEDURE — 80053 COMPREHEN METABOLIC PANEL: CPT

## 2025-08-18 PROCEDURE — 3077F SYST BP >= 140 MM HG: CPT | Performed by: INTERNAL MEDICINE

## 2025-08-19 LAB
KAPPA LC SERPL-MCNC: 147.44 MG/DL (ref 0.33–1.94)
KAPPA LC/LAMBDA SER: 63.28 {RATIO} (ref 0.26–1.65)
LAMBDA LC SERPL-MCNC: 2.33 MG/DL (ref 0.57–2.63)
PTH-INTACT SERPL-MCNC: 110.2 PG/ML (ref 18.5–88)

## 2025-08-24 LAB
ALBUMIN: 4.2 G/DL (ref 3.4–5)
ALPHA 1 GLOBULIN: 0.4 G/DL (ref 0.2–0.6)
ALPHA 2 GLOBULIN: 1.1 G/DL (ref 0.4–1.1)
BETA GLOBULIN: 0.8 G/DL (ref 0.5–1.2)
GAMMA GLOBULIN: 0.8 G/DL (ref 0.5–1.4)
IMMUNOFIXATION COMMENT: ABNORMAL
M-PROTEIN 1: 0.1 G/DL (ref ?–0)
PATH REVIEW - SERUM IMMUNOFIXATION: ABNORMAL
PATH REVIEW-SERUM PROTEIN ELECTROPHORESIS: ABNORMAL
PROTEIN ELECTROPHORESIS COMMENT: ABNORMAL

## 2025-08-26 ENCOUNTER — TELEMEDICINE (OUTPATIENT)
Dept: HEMATOLOGY/ONCOLOGY | Facility: CLINIC | Age: 80
End: 2025-08-26
Payer: MEDICARE

## 2025-08-26 DIAGNOSIS — D64.9 ANEMIA, UNSPECIFIED TYPE: ICD-10-CM

## 2025-08-26 DIAGNOSIS — D47.2 SMOLDERING MULTIPLE MYELOMA: Primary | ICD-10-CM

## 2025-08-26 DIAGNOSIS — N18.9 CHRONIC KIDNEY DISEASE, UNSPECIFIED CKD STAGE: ICD-10-CM

## 2025-08-26 PROCEDURE — 99215 OFFICE O/P EST HI 40 MIN: CPT

## 2025-08-26 ASSESSMENT — ENCOUNTER SYMPTOMS
GASTROINTESTINAL NEGATIVE: 1
RESPIRATORY NEGATIVE: 1
NUMBNESS: 1
CONSTITUTIONAL NEGATIVE: 1
CARDIOVASCULAR NEGATIVE: 1
ALLERGIC/IMMUNOLOGIC NEGATIVE: 1
EYES NEGATIVE: 1
HEMATOLOGIC/LYMPHATIC NEGATIVE: 1
ENDOCRINE NEGATIVE: 1
MUSCULOSKELETAL NEGATIVE: 1
PSYCHIATRIC NEGATIVE: 1

## 2025-11-26 ENCOUNTER — APPOINTMENT (OUTPATIENT)
Dept: HEMATOLOGY/ONCOLOGY | Facility: CLINIC | Age: 80
End: 2025-11-26
Payer: MEDICARE